# Patient Record
Sex: MALE | Race: OTHER | HISPANIC OR LATINO | URBAN - METROPOLITAN AREA
[De-identification: names, ages, dates, MRNs, and addresses within clinical notes are randomized per-mention and may not be internally consistent; named-entity substitution may affect disease eponyms.]

---
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
## 2020-07-10 NOTE — ASU PREOP CHECKLIST - INTERNAL PROSTHESES
Patient tolerated infusion well  Offers no complaints  Pt is aware of all future appointments   Refused AVS 
no
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
## 2021-04-06 ENCOUNTER — NON-APPOINTMENT (OUTPATIENT)
Age: 21
End: 2021-04-06

## 2021-04-06 ENCOUNTER — APPOINTMENT (OUTPATIENT)
Dept: DERMATOLOGY | Facility: CLINIC | Age: 21
End: 2021-04-06
Payer: MEDICAID

## 2021-04-06 VITALS — WEIGHT: 200 LBS | HEIGHT: 69 IN | BODY MASS INDEX: 29.62 KG/M2

## 2021-04-06 PROCEDURE — 17110 DESTRUCTION B9 LES UP TO 14: CPT

## 2021-04-06 PROCEDURE — 99202 OFFICE O/P NEW SF 15 MIN: CPT | Mod: 25

## 2021-04-06 PROCEDURE — 99072 ADDL SUPL MATRL&STAF TM PHE: CPT

## 2021-04-06 RX ORDER — FLUOROURACIL 50 MG/G
5 CREAM TOPICAL
Qty: 1 | Refills: 1 | Status: ACTIVE | COMMUNITY
Start: 2021-04-06 | End: 1900-01-01

## 2021-05-06 ENCOUNTER — APPOINTMENT (OUTPATIENT)
Dept: DERMATOLOGY | Facility: CLINIC | Age: 21
End: 2021-05-06
Payer: MEDICAID

## 2021-05-06 DIAGNOSIS — B07.8 OTHER VIRAL WARTS: ICD-10-CM

## 2021-05-06 PROCEDURE — 99072 ADDL SUPL MATRL&STAF TM PHE: CPT

## 2021-05-06 PROCEDURE — 99212 OFFICE O/P EST SF 10 MIN: CPT | Mod: 25

## 2021-05-06 PROCEDURE — 17110 DESTRUCTION B9 LES UP TO 14: CPT

## 2021-06-03 ENCOUNTER — APPOINTMENT (OUTPATIENT)
Dept: DERMATOLOGY | Facility: CLINIC | Age: 21
End: 2021-06-03

## 2022-10-29 ENCOUNTER — TRANSCRIPTION ENCOUNTER (OUTPATIENT)
Age: 22
End: 2022-10-29

## 2022-10-29 ENCOUNTER — INPATIENT (INPATIENT)
Facility: HOSPITAL | Age: 22
LOS: 7 days | Discharge: HOME | DRG: 488 | End: 2022-11-06
Attending: ORTHOPAEDIC SURGERY | Admitting: ORTHOPAEDIC SURGERY
Payer: MEDICAID

## 2022-10-29 VITALS
DIASTOLIC BLOOD PRESSURE: 68 MMHG | HEART RATE: 63 BPM | TEMPERATURE: 98 F | RESPIRATION RATE: 16 BRPM | OXYGEN SATURATION: 97 % | SYSTOLIC BLOOD PRESSURE: 110 MMHG | HEIGHT: 68 IN

## 2022-10-29 DIAGNOSIS — T14.8XXA OTHER INJURY OF UNSPECIFIED BODY REGION, INITIAL ENCOUNTER: ICD-10-CM

## 2022-10-29 DIAGNOSIS — Z98.890 OTHER SPECIFIED POSTPROCEDURAL STATES: Chronic | ICD-10-CM

## 2022-10-29 LAB
ALBUMIN SERPL ELPH-MCNC: 4.1 G/DL — SIGNIFICANT CHANGE UP (ref 3.3–5)
ALP SERPL-CCNC: 97 U/L — SIGNIFICANT CHANGE UP (ref 40–120)
ALT FLD-CCNC: 30 U/L — SIGNIFICANT CHANGE UP (ref 10–45)
ANION GAP SERPL CALC-SCNC: 12 MMOL/L — SIGNIFICANT CHANGE UP (ref 5–17)
APPEARANCE UR: CLEAR — SIGNIFICANT CHANGE UP
APTT BLD: 24 SEC — LOW (ref 27.5–35.5)
AST SERPL-CCNC: 22 U/L — SIGNIFICANT CHANGE UP (ref 10–40)
BASOPHILS # BLD AUTO: 0.03 K/UL — SIGNIFICANT CHANGE UP (ref 0–0.2)
BASOPHILS NFR BLD AUTO: 0.2 % — SIGNIFICANT CHANGE UP (ref 0–2)
BILIRUB SERPL-MCNC: 0.8 MG/DL — SIGNIFICANT CHANGE UP (ref 0.2–1.2)
BILIRUB UR-MCNC: NEGATIVE — SIGNIFICANT CHANGE UP
BLD GP AB SCN SERPL QL: NEGATIVE — SIGNIFICANT CHANGE UP
BUN SERPL-MCNC: 10 MG/DL — SIGNIFICANT CHANGE UP (ref 7–23)
CALCIUM SERPL-MCNC: 9.1 MG/DL — SIGNIFICANT CHANGE UP (ref 8.4–10.5)
CHLORIDE SERPL-SCNC: 103 MMOL/L — SIGNIFICANT CHANGE UP (ref 96–108)
CO2 SERPL-SCNC: 23 MMOL/L — SIGNIFICANT CHANGE UP (ref 22–31)
COLOR SPEC: SIGNIFICANT CHANGE UP
CREAT SERPL-MCNC: 1.04 MG/DL — SIGNIFICANT CHANGE UP (ref 0.5–1.3)
DIFF PNL FLD: NEGATIVE — SIGNIFICANT CHANGE UP
EGFR: 103 ML/MIN/1.73M2 — SIGNIFICANT CHANGE UP
EOSINOPHIL # BLD AUTO: 0.02 K/UL — SIGNIFICANT CHANGE UP (ref 0–0.5)
EOSINOPHIL NFR BLD AUTO: 0.1 % — SIGNIFICANT CHANGE UP (ref 0–6)
GAS PNL BLDV: SIGNIFICANT CHANGE UP
GLUCOSE SERPL-MCNC: 100 MG/DL — HIGH (ref 70–99)
GLUCOSE UR QL: NEGATIVE — SIGNIFICANT CHANGE UP
HCT VFR BLD CALC: 49.8 % — SIGNIFICANT CHANGE UP (ref 39–50)
HGB BLD-MCNC: 16.7 G/DL — SIGNIFICANT CHANGE UP (ref 13–17)
IMM GRANULOCYTES NFR BLD AUTO: 0.7 % — SIGNIFICANT CHANGE UP (ref 0–0.9)
INR BLD: 1.31 RATIO — HIGH (ref 0.88–1.16)
KETONES UR-MCNC: ABNORMAL
LACTATE SERPL-SCNC: 1.1 MMOL/L — SIGNIFICANT CHANGE UP (ref 0.5–2)
LEUKOCYTE ESTERASE UR-ACNC: NEGATIVE — SIGNIFICANT CHANGE UP
LIDOCAIN IGE QN: 36 U/L — SIGNIFICANT CHANGE UP (ref 7–60)
LYMPHOCYTES # BLD AUTO: 18.2 % — SIGNIFICANT CHANGE UP (ref 13–44)
LYMPHOCYTES # BLD AUTO: 3.03 K/UL — SIGNIFICANT CHANGE UP (ref 1–3.3)
MCHC RBC-ENTMCNC: 29.1 PG — SIGNIFICANT CHANGE UP (ref 27–34)
MCHC RBC-ENTMCNC: 33.5 GM/DL — SIGNIFICANT CHANGE UP (ref 32–36)
MCV RBC AUTO: 86.9 FL — SIGNIFICANT CHANGE UP (ref 80–100)
MONOCYTES # BLD AUTO: 1.33 K/UL — HIGH (ref 0–0.9)
MONOCYTES NFR BLD AUTO: 8 % — SIGNIFICANT CHANGE UP (ref 2–14)
NEUTROPHILS # BLD AUTO: 12.14 K/UL — HIGH (ref 1.8–7.4)
NEUTROPHILS NFR BLD AUTO: 72.8 % — SIGNIFICANT CHANGE UP (ref 43–77)
NITRITE UR-MCNC: NEGATIVE — SIGNIFICANT CHANGE UP
NRBC # BLD: 0 /100 WBCS — SIGNIFICANT CHANGE UP (ref 0–0)
PH UR: 6.5 — SIGNIFICANT CHANGE UP (ref 5–8)
PLATELET # BLD AUTO: 281 K/UL — SIGNIFICANT CHANGE UP (ref 150–400)
POTASSIUM SERPL-MCNC: 3.7 MMOL/L — SIGNIFICANT CHANGE UP (ref 3.5–5.3)
POTASSIUM SERPL-SCNC: 3.7 MMOL/L — SIGNIFICANT CHANGE UP (ref 3.5–5.3)
PROT SERPL-MCNC: 7 G/DL — SIGNIFICANT CHANGE UP (ref 6–8.3)
PROT UR-MCNC: SIGNIFICANT CHANGE UP
PROTHROM AB SERPL-ACNC: 15.2 SEC — HIGH (ref 10.5–13.4)
RBC # BLD: 5.73 M/UL — SIGNIFICANT CHANGE UP (ref 4.2–5.8)
RBC # FLD: 12.8 % — SIGNIFICANT CHANGE UP (ref 10.3–14.5)
RH IG SCN BLD-IMP: POSITIVE — SIGNIFICANT CHANGE UP
SARS-COV-2 RNA SPEC QL NAA+PROBE: SIGNIFICANT CHANGE UP
SODIUM SERPL-SCNC: 138 MMOL/L — SIGNIFICANT CHANGE UP (ref 135–145)
SP GR SPEC: 1.05 — HIGH (ref 1.01–1.02)
UROBILINOGEN FLD QL: NEGATIVE — SIGNIFICANT CHANGE UP
WBC # BLD: 16.66 K/UL — HIGH (ref 3.8–10.5)
WBC # FLD AUTO: 16.66 K/UL — HIGH (ref 3.8–10.5)

## 2022-10-29 PROCEDURE — 99283 EMERGENCY DEPT VISIT LOW MDM: CPT

## 2022-10-29 PROCEDURE — 72125 CT NECK SPINE W/O DYE: CPT | Mod: 26,MA

## 2022-10-29 PROCEDURE — 73700 CT LOWER EXTREMITY W/O DYE: CPT | Mod: 26,RT,MA,59

## 2022-10-29 PROCEDURE — 73630 X-RAY EXAM OF FOOT: CPT | Mod: 26,50

## 2022-10-29 PROCEDURE — 73110 X-RAY EXAM OF WRIST: CPT | Mod: 26,50

## 2022-10-29 PROCEDURE — 73610 X-RAY EXAM OF ANKLE: CPT | Mod: 26,RT

## 2022-10-29 PROCEDURE — 71045 X-RAY EXAM CHEST 1 VIEW: CPT | Mod: 26

## 2022-10-29 PROCEDURE — 29125 APPL SHORT ARM SPLINT STATIC: CPT

## 2022-10-29 PROCEDURE — 73130 X-RAY EXAM OF HAND: CPT | Mod: 26,RT

## 2022-10-29 PROCEDURE — 74177 CT ABD & PELVIS W/CONTRAST: CPT | Mod: 26,MA

## 2022-10-29 PROCEDURE — 73564 X-RAY EXAM KNEE 4 OR MORE: CPT | Mod: 26,RT

## 2022-10-29 PROCEDURE — 99291 CRITICAL CARE FIRST HOUR: CPT | Mod: 25

## 2022-10-29 PROCEDURE — 73030 X-RAY EXAM OF SHOULDER: CPT | Mod: 26,RT

## 2022-10-29 PROCEDURE — 71260 CT THORAX DX C+: CPT | Mod: 26,MA

## 2022-10-29 PROCEDURE — 73706 CT ANGIO LWR EXTR W/O&W/DYE: CPT | Mod: 26,RT,MA

## 2022-10-29 PROCEDURE — 70450 CT HEAD/BRAIN W/O DYE: CPT | Mod: 26,MA

## 2022-10-29 RX ORDER — SODIUM CHLORIDE 9 MG/ML
250 INJECTION INTRAMUSCULAR; INTRAVENOUS; SUBCUTANEOUS ONCE
Refills: 0 | Status: COMPLETED | OUTPATIENT
Start: 2022-10-29 | End: 2022-10-29

## 2022-10-29 RX ORDER — ALTEPLASE 100 MG
7.5 KIT INTRAVENOUS ONCE
Refills: 0 | Status: DISCONTINUED | OUTPATIENT
Start: 2022-10-29 | End: 2022-10-29

## 2022-10-29 RX ORDER — INFLUENZA VIRUS VACCINE 15; 15; 15; 15 UG/.5ML; UG/.5ML; UG/.5ML; UG/.5ML
0.5 SUSPENSION INTRAMUSCULAR ONCE
Refills: 0 | Status: DISCONTINUED | OUTPATIENT
Start: 2022-10-29 | End: 2022-11-04

## 2022-10-29 RX ORDER — SODIUM CHLORIDE 9 MG/ML
1000 INJECTION INTRAMUSCULAR; INTRAVENOUS; SUBCUTANEOUS ONCE
Refills: 0 | Status: COMPLETED | OUTPATIENT
Start: 2022-10-29 | End: 2022-10-29

## 2022-10-29 RX ORDER — SODIUM CHLORIDE 9 MG/ML
1000 INJECTION, SOLUTION INTRAVENOUS
Refills: 0 | Status: DISCONTINUED | OUTPATIENT
Start: 2022-10-29 | End: 2022-10-30

## 2022-10-29 RX ORDER — TETANUS TOXOID, REDUCED DIPHTHERIA TOXOID AND ACELLULAR PERTUSSIS VACCINE, ADSORBED 5; 2.5; 8; 8; 2.5 [IU]/.5ML; [IU]/.5ML; UG/.5ML; UG/.5ML; UG/.5ML
0.5 SUSPENSION INTRAMUSCULAR ONCE
Refills: 0 | Status: COMPLETED | OUTPATIENT
Start: 2022-10-29 | End: 2022-10-29

## 2022-10-29 RX ORDER — CEFAZOLIN SODIUM 1 G
1000 VIAL (EA) INJECTION ONCE
Refills: 0 | Status: COMPLETED | OUTPATIENT
Start: 2022-10-29 | End: 2022-10-29

## 2022-10-29 RX ORDER — FENTANYL CITRATE 50 UG/ML
50 INJECTION INTRAVENOUS ONCE
Refills: 0 | Status: DISCONTINUED | OUTPATIENT
Start: 2022-10-29 | End: 2022-10-29

## 2022-10-29 RX ORDER — SODIUM CHLORIDE 9 MG/ML
50 INJECTION INTRAMUSCULAR; INTRAVENOUS; SUBCUTANEOUS
Refills: 0 | Status: DISCONTINUED | OUTPATIENT
Start: 2022-10-29 | End: 2022-10-29

## 2022-10-29 RX ORDER — HYDROMORPHONE HYDROCHLORIDE 2 MG/ML
0.5 INJECTION INTRAMUSCULAR; INTRAVENOUS; SUBCUTANEOUS EVERY 4 HOURS
Refills: 0 | Status: DISCONTINUED | OUTPATIENT
Start: 2022-10-29 | End: 2022-11-04

## 2022-10-29 RX ORDER — FOLIC ACID 0.8 MG
1 TABLET ORAL DAILY
Refills: 0 | Status: DISCONTINUED | OUTPATIENT
Start: 2022-10-29 | End: 2022-11-04

## 2022-10-29 RX ORDER — GENTAMICIN SULFATE 40 MG/ML
70 VIAL (ML) INJECTION ONCE
Refills: 0 | Status: DISCONTINUED | OUTPATIENT
Start: 2022-10-29 | End: 2022-10-29

## 2022-10-29 RX ORDER — OXYCODONE HYDROCHLORIDE 5 MG/1
10 TABLET ORAL EVERY 4 HOURS
Refills: 0 | Status: DISCONTINUED | OUTPATIENT
Start: 2022-10-29 | End: 2022-11-04

## 2022-10-29 RX ORDER — CEFAZOLIN SODIUM 1 G
2000 VIAL (EA) INJECTION EVERY 8 HOURS
Refills: 0 | Status: DISCONTINUED | OUTPATIENT
Start: 2022-10-29 | End: 2022-10-30

## 2022-10-29 RX ORDER — FENTANYL CITRATE 50 UG/ML
25 INJECTION INTRAVENOUS ONCE
Refills: 0 | Status: DISCONTINUED | OUTPATIENT
Start: 2022-10-29 | End: 2022-10-29

## 2022-10-29 RX ORDER — ALTEPLASE 100 MG
67.6 KIT INTRAVENOUS ONCE
Refills: 0 | Status: DISCONTINUED | OUTPATIENT
Start: 2022-10-29 | End: 2022-10-29

## 2022-10-29 RX ORDER — MAGNESIUM HYDROXIDE 400 MG/1
30 TABLET, CHEWABLE ORAL DAILY
Refills: 0 | Status: DISCONTINUED | OUTPATIENT
Start: 2022-10-29 | End: 2022-11-04

## 2022-10-29 RX ORDER — ONDANSETRON 8 MG/1
4 TABLET, FILM COATED ORAL ONCE
Refills: 0 | Status: COMPLETED | OUTPATIENT
Start: 2022-10-29 | End: 2022-10-29

## 2022-10-29 RX ORDER — ACETAMINOPHEN 500 MG
650 TABLET ORAL EVERY 6 HOURS
Refills: 0 | Status: DISCONTINUED | OUTPATIENT
Start: 2022-10-29 | End: 2022-10-30

## 2022-10-29 RX ORDER — GENTAMICIN SULFATE 40 MG/ML
420 VIAL (ML) INJECTION ONCE
Refills: 0 | Status: COMPLETED | OUTPATIENT
Start: 2022-10-29 | End: 2022-10-29

## 2022-10-29 RX ORDER — ACETAMINOPHEN 500 MG
1000 TABLET ORAL ONCE
Refills: 0 | Status: COMPLETED | OUTPATIENT
Start: 2022-10-29 | End: 2022-10-29

## 2022-10-29 RX ORDER — OXYCODONE HYDROCHLORIDE 5 MG/1
5 TABLET ORAL EVERY 4 HOURS
Refills: 0 | Status: DISCONTINUED | OUTPATIENT
Start: 2022-10-29 | End: 2022-11-04

## 2022-10-29 RX ORDER — ENOXAPARIN SODIUM 100 MG/ML
40 INJECTION SUBCUTANEOUS ONCE
Refills: 0 | Status: DISCONTINUED | OUTPATIENT
Start: 2022-10-29 | End: 2022-10-30

## 2022-10-29 RX ADMIN — Medication 300 MILLIGRAM(S): at 21:30

## 2022-10-29 RX ADMIN — Medication 1000 MILLIGRAM(S): at 20:57

## 2022-10-29 RX ADMIN — SODIUM CHLORIDE 1000 MILLILITER(S): 9 INJECTION INTRAMUSCULAR; INTRAVENOUS; SUBCUTANEOUS at 20:57

## 2022-10-29 RX ADMIN — Medication 100 MILLIGRAM(S): at 17:31

## 2022-10-29 RX ADMIN — FENTANYL CITRATE 50 MICROGRAM(S): 50 INJECTION INTRAVENOUS at 20:55

## 2022-10-29 RX ADMIN — TETANUS TOXOID, REDUCED DIPHTHERIA TOXOID AND ACELLULAR PERTUSSIS VACCINE, ADSORBED 0.5 MILLILITER(S): 5; 2.5; 8; 8; 2.5 SUSPENSION INTRAMUSCULAR at 17:29

## 2022-10-29 RX ADMIN — FENTANYL CITRATE 25 MICROGRAM(S): 50 INJECTION INTRAVENOUS at 18:53

## 2022-10-29 RX ADMIN — SODIUM CHLORIDE 250 MILLILITER(S): 9 INJECTION INTRAMUSCULAR; INTRAVENOUS; SUBCUTANEOUS at 20:57

## 2022-10-29 RX ADMIN — SODIUM CHLORIDE 250 MILLILITER(S): 9 INJECTION INTRAMUSCULAR; INTRAVENOUS; SUBCUTANEOUS at 17:33

## 2022-10-29 RX ADMIN — Medication 400 MILLIGRAM(S): at 18:45

## 2022-10-29 RX ADMIN — Medication 100 MILLIGRAM(S): at 20:08

## 2022-10-29 RX ADMIN — ONDANSETRON 4 MILLIGRAM(S): 8 TABLET, FILM COATED ORAL at 22:50

## 2022-10-29 RX ADMIN — SODIUM CHLORIDE 1000 MILLILITER(S): 9 INJECTION INTRAMUSCULAR; INTRAVENOUS; SUBCUTANEOUS at 19:54

## 2022-10-29 NOTE — PATIENT PROFILE ADULT - FALL HARM RISK - HARM RISK INTERVENTIONS
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
## 2022-10-29 NOTE — ED PROVIDER NOTE - NS ED ROS FT
GENERAL: No fever, chills  EYES: no vision changes, no discharge.   ENT: no difficulty swallowing or speaking   CARDIAC: no chest pain/pressure, SOB, lower extremity swelling  PULMONARY: no cough, SOB  GI: no abdominal pain, n/v/d  : no dysuria  SKIN: no rashes  NEURO: no headache, lightheadedness, paresthesia  MSK: +R knee pain. R shoulder pain
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
## 2022-10-29 NOTE — ED PROVIDER NOTE - CARE PLAN
1 Principal Discharge DX:	Avulsion of skin  Secondary Diagnosis:	Open traumatic subluxation of right knee joint
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
## 2022-10-29 NOTE — ED ADULT NURSE NOTE - NSIMPLEMENTINTERV_GEN_ALL_ED
Implemented All Fall Risk Interventions:  [place] to call system. Call bell, personal items and telephone within reach. Instruct patient to call for assistance. Room bathroom lighting operational. Non-slip footwear when patient is off stretcher. Physically safe environment: no spills, clutter or unnecessary equipment. Stretcher in lowest position, wheels locked, appropriate side rails in place. Provide visual cue, wrist band, yellow gown, etc. Monitor gait and stability. Monitor for mental status changes and reorient to person, place, and time. Review medications for side effects contributing to fall risk. Reinforce activity limits and safety measures with patient and family.

## 2022-10-29 NOTE — PROVIDER CONTACT NOTE (OTHER) - ASSESSMENT
Pt a&ox4, VSS. Pt denies chest pain or discomfort. Pt c/o nausea at this time. Fentanyl given for pain at 2055

## 2022-10-29 NOTE — ED PROVIDER NOTE - ATTENDING CONTRIBUTION TO CARE
Attending MD Madeline Elmore:  I personally have seen and examined this patient.  Resident note reviewed and agree on plan of care and except where noted.  See HPI, PE, and MDM for details.

## 2022-10-29 NOTE — ED PROVIDER NOTE - PROGRESS NOTE DETAILS
ortho consulted for concern for open joint knee Attending Madeline Elmore: concern for possible open fracture. pt with strong distal pulses. pt received ancef already after initial evaluation. called ct scans to expedite Gogo MCDONALD PGY-3: spoke with orthopedics who will take pt for OR washout in the AM. still pending pan CT to eval for other injuries. Patient with R hand 5th metacarpal base fx and L hand 2nd head metacarpal fx. spoke with plastics, pt to have R ulnar gutter splint Gogo MCDONALD PGY-3: spoke with orthopedics who will take pt for OR washout in the AM. still pending pan CT to eval for other injuries. Patient with R hand 5th metacarpal base fx and L hand 2nd head metacarpal fx. spoke with plastics, pt to have R ulnar gutter splint +/- to follow at outpatient office Attending Madeline Elmore: d/w trauma will come to evaluate
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
## 2022-10-29 NOTE — ED PROVIDER NOTE - PHYSICAL EXAMINATION
Attending Madeline Elmore: Gen: NAD, heent: atrauamtic, eomi, perrla, mmm, op pink, uvula midline, neck; nttp, no nuchal rigidity, chest: nttp, no crepitus, cv: rrr, no murmurs, lungs: ctab, abd: soft, nontender, nondistended, no peritoneal signs,, no guarding, ext: wwp, swelling to base of 2nd metacarpal on left hand dorsum with ttp, cap refill to digit in tact, avulsion injury to right knee, no visible bone seen. pt able to range his knee, abrasions to left and right foot, abrasion and ecchymoses to right ankle medial and lateraol malleous. ttp right anterior shoulder, no deformity, neuro: awake and alert, following commands, speech clear, sensation and strength intact, no focal deficits

## 2022-10-30 LAB
ANION GAP SERPL CALC-SCNC: 9 MMOL/L — SIGNIFICANT CHANGE UP (ref 5–17)
APTT BLD: 28.5 SEC — SIGNIFICANT CHANGE UP (ref 27.5–35.5)
BUN SERPL-MCNC: 9 MG/DL — SIGNIFICANT CHANGE UP (ref 7–23)
CALCIUM SERPL-MCNC: 8.8 MG/DL — SIGNIFICANT CHANGE UP (ref 8.4–10.5)
CHLORIDE SERPL-SCNC: 106 MMOL/L — SIGNIFICANT CHANGE UP (ref 96–108)
CO2 SERPL-SCNC: 24 MMOL/L — SIGNIFICANT CHANGE UP (ref 22–31)
CREAT SERPL-MCNC: 0.94 MG/DL — SIGNIFICANT CHANGE UP (ref 0.5–1.3)
EGFR: 117 ML/MIN/1.73M2 — SIGNIFICANT CHANGE UP
GLUCOSE SERPL-MCNC: 106 MG/DL — HIGH (ref 70–99)
HCT VFR BLD CALC: 45.3 % — SIGNIFICANT CHANGE UP (ref 39–50)
HGB BLD-MCNC: 15.2 G/DL — SIGNIFICANT CHANGE UP (ref 13–17)
INR BLD: 1.29 RATIO — HIGH (ref 0.88–1.16)
MCHC RBC-ENTMCNC: 29 PG — SIGNIFICANT CHANGE UP (ref 27–34)
MCHC RBC-ENTMCNC: 33.6 GM/DL — SIGNIFICANT CHANGE UP (ref 32–36)
MCV RBC AUTO: 86.5 FL — SIGNIFICANT CHANGE UP (ref 80–100)
NRBC # BLD: 0 /100 WBCS — SIGNIFICANT CHANGE UP (ref 0–0)
PLATELET # BLD AUTO: 227 K/UL — SIGNIFICANT CHANGE UP (ref 150–400)
POTASSIUM SERPL-MCNC: 3.5 MMOL/L — SIGNIFICANT CHANGE UP (ref 3.5–5.3)
POTASSIUM SERPL-SCNC: 3.5 MMOL/L — SIGNIFICANT CHANGE UP (ref 3.5–5.3)
PROTHROM AB SERPL-ACNC: 15 SEC — HIGH (ref 10.5–13.4)
RBC # BLD: 5.24 M/UL — SIGNIFICANT CHANGE UP (ref 4.2–5.8)
RBC # FLD: 13 % — SIGNIFICANT CHANGE UP (ref 10.3–14.5)
SODIUM SERPL-SCNC: 139 MMOL/L — SIGNIFICANT CHANGE UP (ref 135–145)
WBC # BLD: 12.5 K/UL — HIGH (ref 3.8–10.5)
WBC # FLD AUTO: 12.5 K/UL — HIGH (ref 3.8–10.5)

## 2022-10-30 RX ORDER — ASPIRIN/CALCIUM CARB/MAGNESIUM 324 MG
81 TABLET ORAL
Refills: 0 | Status: DISCONTINUED | OUTPATIENT
Start: 2022-10-30 | End: 2022-11-04

## 2022-10-30 RX ORDER — FENTANYL CITRATE 50 UG/ML
25 INJECTION INTRAVENOUS
Refills: 0 | Status: DISCONTINUED | OUTPATIENT
Start: 2022-10-30 | End: 2022-10-30

## 2022-10-30 RX ORDER — ACETAMINOPHEN 500 MG
975 TABLET ORAL EVERY 8 HOURS
Refills: 0 | Status: DISCONTINUED | OUTPATIENT
Start: 2022-10-30 | End: 2022-11-04

## 2022-10-30 RX ORDER — CEFAZOLIN SODIUM 1 G
2000 VIAL (EA) INJECTION EVERY 8 HOURS
Refills: 0 | Status: COMPLETED | OUTPATIENT
Start: 2022-10-30 | End: 2022-10-31

## 2022-10-30 RX ORDER — ONDANSETRON 8 MG/1
4 TABLET, FILM COATED ORAL ONCE
Refills: 0 | Status: DISCONTINUED | OUTPATIENT
Start: 2022-10-30 | End: 2022-10-30

## 2022-10-30 RX ADMIN — Medication 100 MILLIGRAM(S): at 17:52

## 2022-10-30 RX ADMIN — Medication 975 MILLIGRAM(S): at 14:25

## 2022-10-30 RX ADMIN — FENTANYL CITRATE 25 MICROGRAM(S): 50 INJECTION INTRAVENOUS at 10:00

## 2022-10-30 RX ADMIN — Medication 975 MILLIGRAM(S): at 22:42

## 2022-10-30 RX ADMIN — Medication 975 MILLIGRAM(S): at 15:20

## 2022-10-30 RX ADMIN — Medication 975 MILLIGRAM(S): at 22:12

## 2022-10-30 RX ADMIN — Medication 1 MILLIGRAM(S): at 12:46

## 2022-10-30 RX ADMIN — Medication 650 MILLIGRAM(S): at 01:33

## 2022-10-30 RX ADMIN — FENTANYL CITRATE 25 MICROGRAM(S): 50 INJECTION INTRAVENOUS at 09:45

## 2022-10-30 RX ADMIN — Medication 650 MILLIGRAM(S): at 01:03

## 2022-10-30 RX ADMIN — SODIUM CHLORIDE 100 MILLILITER(S): 9 INJECTION, SOLUTION INTRAVENOUS at 00:10

## 2022-10-30 RX ADMIN — Medication 100 MILLIGRAM(S): at 01:03

## 2022-10-30 RX ADMIN — Medication 1 TABLET(S): at 12:46
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
## 2022-10-30 NOTE — ED POST DISCHARGE NOTE - RESULT SUMMARY
Radiology Discrepancy Reported On [place]: Xray R knee +Subchondral impaction fracture with multiple small fracture fragments of the lateral femoral condyle as seen on right knee CT. Pt taken to OR with ortho for arthrotomy I&D. No change in management. - Natalie Barahona PA-C
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
## 2022-10-31 ENCOUNTER — TRANSCRIPTION ENCOUNTER (OUTPATIENT)
Age: 22
End: 2022-10-31

## 2022-10-31 LAB
ANION GAP SERPL CALC-SCNC: 11 MMOL/L — SIGNIFICANT CHANGE UP (ref 5–17)
BUN SERPL-MCNC: 8 MG/DL — SIGNIFICANT CHANGE UP (ref 7–23)
CALCIUM SERPL-MCNC: 8.6 MG/DL — SIGNIFICANT CHANGE UP (ref 8.4–10.5)
CHLORIDE SERPL-SCNC: 106 MMOL/L — SIGNIFICANT CHANGE UP (ref 96–108)
CO2 SERPL-SCNC: 23 MMOL/L — SIGNIFICANT CHANGE UP (ref 22–31)
CREAT SERPL-MCNC: 0.89 MG/DL — SIGNIFICANT CHANGE UP (ref 0.5–1.3)
EGFR: 123 ML/MIN/1.73M2 — SIGNIFICANT CHANGE UP
GLUCOSE SERPL-MCNC: 90 MG/DL — SIGNIFICANT CHANGE UP (ref 70–99)
HCT VFR BLD CALC: 42.9 % — SIGNIFICANT CHANGE UP (ref 39–50)
HGB BLD-MCNC: 14.2 G/DL — SIGNIFICANT CHANGE UP (ref 13–17)
MCHC RBC-ENTMCNC: 29 PG — SIGNIFICANT CHANGE UP (ref 27–34)
MCHC RBC-ENTMCNC: 33.1 GM/DL — SIGNIFICANT CHANGE UP (ref 32–36)
MCV RBC AUTO: 87.6 FL — SIGNIFICANT CHANGE UP (ref 80–100)
NRBC # BLD: 0 /100 WBCS — SIGNIFICANT CHANGE UP (ref 0–0)
PLATELET # BLD AUTO: 198 K/UL — SIGNIFICANT CHANGE UP (ref 150–400)
POTASSIUM SERPL-MCNC: 3.5 MMOL/L — SIGNIFICANT CHANGE UP (ref 3.5–5.3)
POTASSIUM SERPL-SCNC: 3.5 MMOL/L — SIGNIFICANT CHANGE UP (ref 3.5–5.3)
RBC # BLD: 4.9 M/UL — SIGNIFICANT CHANGE UP (ref 4.2–5.8)
RBC # FLD: 13 % — SIGNIFICANT CHANGE UP (ref 10.3–14.5)
SODIUM SERPL-SCNC: 140 MMOL/L — SIGNIFICANT CHANGE UP (ref 135–145)
WBC # BLD: 10.74 K/UL — HIGH (ref 3.8–10.5)
WBC # FLD AUTO: 10.74 K/UL — HIGH (ref 3.8–10.5)

## 2022-10-31 RX ADMIN — Medication 975 MILLIGRAM(S): at 21:18

## 2022-10-31 RX ADMIN — OXYCODONE HYDROCHLORIDE 5 MILLIGRAM(S): 5 TABLET ORAL at 16:29

## 2022-10-31 RX ADMIN — OXYCODONE HYDROCHLORIDE 5 MILLIGRAM(S): 5 TABLET ORAL at 14:42

## 2022-10-31 RX ADMIN — Medication 81 MILLIGRAM(S): at 17:04

## 2022-10-31 RX ADMIN — Medication 1 TABLET(S): at 12:44

## 2022-10-31 RX ADMIN — Medication 81 MILLIGRAM(S): at 05:23

## 2022-10-31 RX ADMIN — Medication 975 MILLIGRAM(S): at 05:23

## 2022-10-31 RX ADMIN — Medication 975 MILLIGRAM(S): at 21:48

## 2022-10-31 RX ADMIN — Medication 975 MILLIGRAM(S): at 05:53

## 2022-10-31 RX ADMIN — Medication 1 MILLIGRAM(S): at 12:44

## 2022-10-31 RX ADMIN — OXYCODONE HYDROCHLORIDE 10 MILLIGRAM(S): 5 TABLET ORAL at 20:09

## 2022-10-31 RX ADMIN — OXYCODONE HYDROCHLORIDE 10 MILLIGRAM(S): 5 TABLET ORAL at 20:39

## 2022-10-31 RX ADMIN — Medication 100 MILLIGRAM(S): at 01:21

## 2022-10-31 NOTE — DISCHARGE NOTE PROVIDER - CARE PROVIDER_API CALL
Robinson Tobias)  Orthopaedic Surgery  [address] [place] [address], [address]  [place] [address]  Phone: [phone]  Fax: [phone]  Established Patient  Follow Up Time: 2 weeks   Agustina Meza (MD)  Surgery  [address] [place] [id number]  Phone: [phone]  Fax: [phone]  Follow Up Time: 1 week   Emerald Curtis (MD; MPH)  Orthopaedic Surgery  [address] [facility], [address]  [place] [address]  Phone: [phone]  Fax: [phone]  Follow Up Time:

## 2022-10-31 NOTE — CONSULT NOTE ADULT - ADDITIONAL PE
Examination of the hand reveals immobilized wrists. The fingers are perfused well. No vascular compromise noted.

## 2022-10-31 NOTE — PHYSICAL THERAPY INITIAL EVALUATION ADULT - MANUAL MUSCLE TESTING RESULTS, REHAB EVAL
b/l elbows and shoulders at least 3/5; b/l wrist and fingers not formally tested due to casting and splint material; RLE in KI; LLE in short leg cast

## 2022-10-31 NOTE — PHYSICAL THERAPY INITIAL EVALUATION ADULT - ADDITIONAL COMMENTS
Pt lives with parents however states upon d/c he can stay at brothers home with elevator access and ramp. Prior to admission pt was independent with all functional mobility and no AD.

## 2022-10-31 NOTE — DISCHARGE NOTE PROVIDER - NSDCFUADDINST_GEN_ALL_CORE_FT
Follow up with Dr. Tobias in 2 weeks.   Weight bearing: You may weight bear on the right leg with knee immobilizer on at all times. You may not weight bear on the left leg at all. Left and right hand:  Continue with Aspirin as prescribed for blood clot prevention. Continue with protonix for stomach protection.    Follow up with Orthopedic Clinic within 2 weeks. Please call number above for [facility] to schedule appointment.    Weight bearing: You may weight bear on the right leg with knee immobilizer on at all times. You may not weight bear on the left leg or left and right hand at all.   Continue with baby Aspirin 81 mg orally twice a day x 6 weeks for blood clot prevention. Continue with protonix for stomach protection.   Sponge bathe only, must keep cast and hand splits dry at all times.     Weight bearing: Non weightbearing bilateral upper extremities except for use with platform walker, you may weight bear on the right leg with knee immobilizer on at all times. You may not weight bear on the left leg.    Continue with baby Aspirin 81 mg orally twice a day x 6 weeks for blood clot prevention. Continue with Protonix for stomach protection.     Sponge bathe only, must keep cast and hand splits dry at all times.

## 2022-10-31 NOTE — DISCHARGE NOTE PROVIDER - PROVIDER TOKENS
PROVIDER:[TOKEN:[[id number]],FOLLOWUP:[2 weeks],ESTABLISHEDPATIENT:[T]] PROVIDER:[TOKEN:[[id number]],FOLLOWUP:[1 week]] PROVIDER:[TOKEN:[[id number]]]

## 2022-10-31 NOTE — DISCHARGE NOTE PROVIDER - NSDCHHASSISTDEVIC_GEN_ALL_CORE_FT
S/P R Knee traumatic arthrotomy I&D, S/P L metatarsal shaft Fx closed reduction and casting, s/p L Index metacarpal Fx Im screw fixation and R 5th digit metacarpal base Fx closed reduction percutaneous pinning.
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
## 2022-10-31 NOTE — DISCHARGE NOTE PROVIDER - CARE PROVIDERS DIRECT ADDRESSES
,DirectAddress_Unknown ,[email or web address]

## 2022-10-31 NOTE — DISCHARGE NOTE PROVIDER - NSDCHC_MEDRECSTATUS_GEN_ALL_CORE
Admission Reconciliation is Completed  Discharge Reconciliation is Not Complete (4) rarely moist Admission Reconciliation is Completed  Discharge Reconciliation is Completed
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
## 2022-10-31 NOTE — DISCHARGE NOTE PROVIDER - NSFOLLOWUPCLINICS_GEN_ALL_ED_FT
[facility] Orthopedic Surgery  Orthopedic Surgery  [address], [address]rd & 4th [place] [address]  Phone: [phone]  Fax:   Follow Up Time: 2 weeks
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
## 2022-10-31 NOTE — DISCHARGE NOTE PROVIDER - NSDCCPTREATMENT_GEN_ALL_CORE_FT
PRINCIPAL PROCEDURE  Procedure: Incision and drainage of bursa of knee  Findings and Treatment:       SECONDARY PROCEDURE  Procedure: Closed treatment, fracture, metatarsal bone, without manipulation  Findings and Treatment:

## 2022-10-31 NOTE — DISCHARGE NOTE PROVIDER - NSDCCPCAREPLAN_GEN_ALL_CORE_FT
PRINCIPAL DISCHARGE DIAGNOSIS  Diagnosis: Open traumatic subluxation of right knee joint  Assessment and Plan of Treatment:       SECONDARY DISCHARGE DIAGNOSES  Diagnosis: Open traumatic subluxation of right knee joint  Assessment and Plan of Treatment:     Diagnosis: Closed displaced fracture of metatarsal bone, initial encounter  Assessment and Plan of Treatment:     Diagnosis: Fracture of metacarpal of left hand, closed  Assessment and Plan of Treatment:     Diagnosis: Closed fracture of metacarpal bone of right hand  Assessment and Plan of Treatment:

## 2022-10-31 NOTE — DISCHARGE NOTE PROVIDER - NSDCMRMEDTOKEN_GEN_ALL_CORE_FT
Ascriptin 325 mg oral tablet: 1 tab(s) orally once a day for dvt ppx MDD:1  Doculase 100 mg oral capsule: 1 cap(s) orally 3 times a day for constipation MDD:3  ondansetron 4 mg oral tablet: 1 tab(s) orally every 4 hours prn for nausea  oxyCODONE 5 mg oral capsule: 1 cap(s) orally every 4 hours, please take 1-2 tablets by mouth every 4-6 hrs as needed for painMDD:6   acetaminophen 325 mg oral tablet: 3 tab(s) orally every 8 hours  aspirin 81 mg oral delayed release tablet: 1 tab(s) orally 2 times a day  Drop arm commode: Dx: R Knee Traumatic Arthrotomy I&amp;D  JIA: 99M  oxyCODONE 5 mg oral tablet: 1 tab(s) orally every 4 hours, As needed, Severe Pain (7 - 10) MDD:6  pantoprazole 40 mg oral delayed release tablet: 1 tab(s) orally once a day (before a meal)  senna leaf extract oral tablet: 2 tab(s) orally once a day (at bedtime), As Needed for constipation while on narcotic pain meds  standard wheelchair with elevated rests: s/p BUE Fractures        Arthrotomy right knee      JIA:99mos    traMADol 50 mg oral tablet: 1 tab(s) orally every 6 hours, As needed, Moderate Pain (4-6) MDD:4

## 2022-11-01 LAB — SARS-COV-2 RNA SPEC QL NAA+PROBE: SIGNIFICANT CHANGE UP

## 2022-11-01 RX ADMIN — Medication 975 MILLIGRAM(S): at 21:10

## 2022-11-01 RX ADMIN — OXYCODONE HYDROCHLORIDE 10 MILLIGRAM(S): 5 TABLET ORAL at 22:00

## 2022-11-01 RX ADMIN — Medication 975 MILLIGRAM(S): at 22:00

## 2022-11-01 RX ADMIN — Medication 975 MILLIGRAM(S): at 12:19

## 2022-11-01 RX ADMIN — Medication 81 MILLIGRAM(S): at 17:55

## 2022-11-01 RX ADMIN — Medication 975 MILLIGRAM(S): at 13:00

## 2022-11-01 RX ADMIN — Medication 1 TABLET(S): at 12:20

## 2022-11-01 RX ADMIN — Medication 975 MILLIGRAM(S): at 07:05

## 2022-11-01 RX ADMIN — Medication 1 MILLIGRAM(S): at 12:20

## 2022-11-01 RX ADMIN — Medication 81 MILLIGRAM(S): at 06:36

## 2022-11-01 RX ADMIN — OXYCODONE HYDROCHLORIDE 10 MILLIGRAM(S): 5 TABLET ORAL at 21:10

## 2022-11-01 RX ADMIN — Medication 975 MILLIGRAM(S): at 06:35
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
## 2022-11-02 PROCEDURE — 99223 1ST HOSP IP/OBS HIGH 75: CPT

## 2022-11-02 RX ORDER — SENNA PLUS 8.6 MG/1
2 TABLET ORAL AT BEDTIME
Refills: 0 | Status: DISCONTINUED | OUTPATIENT
Start: 2022-11-02 | End: 2022-11-04

## 2022-11-02 RX ORDER — POLYETHYLENE GLYCOL 3350 17 G/17G
17 POWDER, FOR SOLUTION ORAL AT BEDTIME
Refills: 0 | Status: DISCONTINUED | OUTPATIENT
Start: 2022-11-02 | End: 2022-11-04

## 2022-11-02 RX ADMIN — Medication 81 MILLIGRAM(S): at 17:27

## 2022-11-02 RX ADMIN — Medication 1 TABLET(S): at 11:16

## 2022-11-02 RX ADMIN — Medication 1 MILLIGRAM(S): at 11:16

## 2022-11-02 RX ADMIN — Medication 975 MILLIGRAM(S): at 13:35

## 2022-11-02 RX ADMIN — Medication 975 MILLIGRAM(S): at 07:20

## 2022-11-02 RX ADMIN — Medication 975 MILLIGRAM(S): at 21:44

## 2022-11-02 RX ADMIN — SENNA PLUS 2 TABLET(S): 8.6 TABLET ORAL at 21:14

## 2022-11-02 RX ADMIN — Medication 975 MILLIGRAM(S): at 13:04

## 2022-11-02 RX ADMIN — Medication 975 MILLIGRAM(S): at 21:14

## 2022-11-02 RX ADMIN — Medication 81 MILLIGRAM(S): at 06:29

## 2022-11-02 RX ADMIN — Medication 975 MILLIGRAM(S): at 06:30

## 2022-11-02 NOTE — PROGRESS NOTE ADULT - ASSESSMENT
A/P: 22M POD2 s/p R knee I&D POD #3 and SLC application of LLE    - Medical comanagement appreciated  - Pain control PRN  - FU AM Labs  - DVT ppx: ASA  - WBAT RLE in KI at all times, no knee flexion  - NWB LLE in SLC  - FU hand surgery recs appreciated regarding UE injuries.   - PT/OT  - Orthopedically stable for discharge. Patient to follow up with Dr. Tobias for further evaluation and treatment. Plan for discharge home today if clears physical therapy evaluation  Will discuss with attending Dr. Tobias and advise if any changes to plan A/P: 22M POD2 s/p R knee I&D POD #3 and SLC application of LLE    - Medical comanagement appreciated  - Pain control PRN  - FU AM Labs  - DVT ppx: ASA  - WBAT RLE in KI at all times, no knee flexion  - NWB LLE in SLC  - FU hand surgery recs appreciated regarding UE injuries. Patient with acute comminuted fx of the left second MC, as well as right 5th MC fx. Continue immobilization, with outpatient fu as per Plastic sx consult.   - PT/OT/OOB  - Orthopedically stable for discharge. Patient to follow up with Dr. Tobias for further evaluation and treatment.   - Dispo: AR, pending PMR consult, bed/auth.  Will discuss with attending Dr. Tobias and advise if any changes to plan 8

## 2022-11-02 NOTE — CONSULT NOTE ADULT - REASON FOR ADMISSION
R Knee Traumatic Arthrotomy
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
## 2022-11-02 NOTE — PROGRESS NOTE ADULT - NS ATTEND AMEND GEN_ALL_CORE FT
Pt care and plan discussed and reviewed with PA. Plan as outlined above edited by me to reflect our discussion. Advanced care planning/advanced directives discussed with patient/family. DNR status including forceful chest compressions to attempt to restart the heart, ventilator support/artificial breathing, electric shock, artificial nutrition, health care proxy, Molst form all discussed with pt. Pt wishes to consider.
Pt care and plan discussed and reviewed with PA. Plan as outlined above edited by me to reflect our discussion.
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
## 2022-11-03 ENCOUNTER — TRANSCRIPTION ENCOUNTER (OUTPATIENT)
Age: 22
End: 2022-11-03

## 2022-11-03 LAB
BLD GP AB SCN SERPL QL: NEGATIVE — SIGNIFICANT CHANGE UP
RH IG SCN BLD-IMP: POSITIVE — SIGNIFICANT CHANGE UP
SARS-COV-2 RNA SPEC QL NAA+PROBE: SIGNIFICANT CHANGE UP

## 2022-11-03 RX ORDER — SODIUM CHLORIDE 9 MG/ML
1000 INJECTION, SOLUTION INTRAVENOUS
Refills: 0 | Status: DISCONTINUED | OUTPATIENT
Start: 2022-11-04 | End: 2022-11-04

## 2022-11-03 RX ADMIN — Medication 975 MILLIGRAM(S): at 06:17

## 2022-11-03 RX ADMIN — Medication 975 MILLIGRAM(S): at 05:47

## 2022-11-03 RX ADMIN — Medication 975 MILLIGRAM(S): at 21:59

## 2022-11-03 RX ADMIN — Medication 81 MILLIGRAM(S): at 18:07

## 2022-11-03 RX ADMIN — Medication 975 MILLIGRAM(S): at 21:29

## 2022-11-03 RX ADMIN — Medication 975 MILLIGRAM(S): at 14:05

## 2022-11-03 RX ADMIN — Medication 975 MILLIGRAM(S): at 14:35

## 2022-11-03 RX ADMIN — Medication 81 MILLIGRAM(S): at 05:46

## 2022-11-03 NOTE — PROGRESS NOTE ADULT - ASSESSMENT
Impression: Stable       Plan:   Continue present treatment                 Bilat UE- treatment per Plastic, spoke with Dr. Meza who states                           patient should follow up in office 1 week.                 Out of bed, pivot, weight bearing right LE in immobilizer                 Physical therapy follow up                 Continue to monitor    Ángel Pedro PA-C  Orthopaedic Surgery  Team pager [phone]  [phone]

## 2022-11-03 NOTE — CHART NOTE - NSCHARTNOTEFT_GEN_A_CORE
Due to the patients non-weight bearing secondary to Bilat UE fractures and left metatarsal fracture. Patient will require a transport wheelchair with elevating leg rests. This is necessary to achieve daily tasks and therapies which cannot be achieved with the use of a cane or rolling walker. Patient and family are in agreement with wheelchair use at home and assistance will be provided if needed     Ángel Pedro PA-C  Orthopaedic Surgery  Team pager [phone]  [phone] Due to the patients non-weight bearing secondary to Bilat UE fractures and left metatarsal fracture. Patient will require a standard wheelchair with elevating leg rests. This is necessary to achieve daily tasks and therapies which cannot be achieved with the use of a cane or rolling walker. Patient and family are in agreement with wheelchair use at home and assistance will be provided if needed     Ángel Pedro PA-C  Orthopaedic Surgery  Team pager [phone]  [phone]

## 2022-11-04 ENCOUNTER — APPOINTMENT (OUTPATIENT)
Dept: ORTHOPEDIC SURGERY | Facility: HOSPITAL | Age: 22
End: 2022-11-04

## 2022-11-04 LAB
ANION GAP SERPL CALC-SCNC: 11 MMOL/L — SIGNIFICANT CHANGE UP (ref 5–17)
APTT BLD: 30.1 SEC — SIGNIFICANT CHANGE UP (ref 27.5–35.5)
BUN SERPL-MCNC: 13 MG/DL — SIGNIFICANT CHANGE UP (ref 7–23)
CALCIUM SERPL-MCNC: 9.6 MG/DL — SIGNIFICANT CHANGE UP (ref 8.4–10.5)
CHLORIDE SERPL-SCNC: 104 MMOL/L — SIGNIFICANT CHANGE UP (ref 96–108)
CO2 SERPL-SCNC: 23 MMOL/L — SIGNIFICANT CHANGE UP (ref 22–31)
CREAT SERPL-MCNC: 0.86 MG/DL — SIGNIFICANT CHANGE UP (ref 0.5–1.3)
EGFR: 125 ML/MIN/1.73M2 — SIGNIFICANT CHANGE UP
GLUCOSE SERPL-MCNC: 87 MG/DL — SIGNIFICANT CHANGE UP (ref 70–99)
HCT VFR BLD CALC: 44.6 % — SIGNIFICANT CHANGE UP (ref 39–50)
HGB BLD-MCNC: 15.2 G/DL — SIGNIFICANT CHANGE UP (ref 13–17)
INR BLD: 1.32 RATIO — HIGH (ref 0.88–1.16)
MCHC RBC-ENTMCNC: 28.8 PG — SIGNIFICANT CHANGE UP (ref 27–34)
MCHC RBC-ENTMCNC: 34.1 GM/DL — SIGNIFICANT CHANGE UP (ref 32–36)
MCV RBC AUTO: 84.5 FL — SIGNIFICANT CHANGE UP (ref 80–100)
NRBC # BLD: 0 /100 WBCS — SIGNIFICANT CHANGE UP (ref 0–0)
PLATELET # BLD AUTO: 252 K/UL — SIGNIFICANT CHANGE UP (ref 150–400)
POTASSIUM SERPL-MCNC: 3.9 MMOL/L — SIGNIFICANT CHANGE UP (ref 3.5–5.3)
POTASSIUM SERPL-SCNC: 3.9 MMOL/L — SIGNIFICANT CHANGE UP (ref 3.5–5.3)
PROTHROM AB SERPL-ACNC: 15.3 SEC — HIGH (ref 10.5–13.4)
RBC # BLD: 5.28 M/UL — SIGNIFICANT CHANGE UP (ref 4.2–5.8)
RBC # FLD: 13 % — SIGNIFICANT CHANGE UP (ref 10.3–14.5)
SODIUM SERPL-SCNC: 138 MMOL/L — SIGNIFICANT CHANGE UP (ref 135–145)
WBC # BLD: 9.03 K/UL — SIGNIFICANT CHANGE UP (ref 3.8–10.5)
WBC # FLD AUTO: 9.03 K/UL — SIGNIFICANT CHANGE UP (ref 3.8–10.5)

## 2022-11-04 PROCEDURE — 26676 PIN HAND DISLOCATION: CPT | Mod: F9

## 2022-11-04 PROCEDURE — 26615 TREAT METACARPAL FRACTURE: CPT | Mod: F1

## 2022-11-04 DEVICE — IMPLANTABLE DEVICE: Type: IMPLANTABLE DEVICE | Site: BILATERAL | Status: FUNCTIONAL

## 2022-11-04 DEVICE — KWIRE 1.6MM X 15.2CM: Type: IMPLANTABLE DEVICE | Site: BILATERAL | Status: FUNCTIONAL

## 2022-11-04 RX ORDER — TRAMADOL HYDROCHLORIDE 50 MG/1
50 TABLET ORAL EVERY 6 HOURS
Refills: 0 | Status: DISCONTINUED | OUTPATIENT
Start: 2022-11-04 | End: 2022-11-06

## 2022-11-04 RX ORDER — POLYETHYLENE GLYCOL 3350 17 G/17G
17 POWDER, FOR SOLUTION ORAL AT BEDTIME
Refills: 0 | Status: DISCONTINUED | OUTPATIENT
Start: 2022-11-04 | End: 2022-11-06

## 2022-11-04 RX ORDER — HYDROMORPHONE HYDROCHLORIDE 2 MG/ML
0.5 INJECTION INTRAMUSCULAR; INTRAVENOUS; SUBCUTANEOUS
Refills: 0 | Status: DISCONTINUED | OUTPATIENT
Start: 2022-11-04 | End: 2022-11-04

## 2022-11-04 RX ORDER — SODIUM CHLORIDE 9 MG/ML
1000 INJECTION, SOLUTION INTRAVENOUS
Refills: 0 | Status: DISCONTINUED | OUTPATIENT
Start: 2022-11-04 | End: 2022-11-04

## 2022-11-04 RX ORDER — OXYCODONE HYDROCHLORIDE 5 MG/1
2.5 TABLET ORAL EVERY 4 HOURS
Refills: 0 | Status: DISCONTINUED | OUTPATIENT
Start: 2022-11-04 | End: 2022-11-06

## 2022-11-04 RX ORDER — ONDANSETRON 8 MG/1
4 TABLET, FILM COATED ORAL ONCE
Refills: 0 | Status: DISCONTINUED | OUTPATIENT
Start: 2022-11-04 | End: 2022-11-04

## 2022-11-04 RX ORDER — OXYCODONE HYDROCHLORIDE 5 MG/1
5 TABLET ORAL EVERY 4 HOURS
Refills: 0 | Status: DISCONTINUED | OUTPATIENT
Start: 2022-11-04 | End: 2022-11-06

## 2022-11-04 RX ORDER — ASPIRIN/CALCIUM CARB/MAGNESIUM 324 MG
81 TABLET ORAL
Refills: 0 | Status: DISCONTINUED | OUTPATIENT
Start: 2022-11-04 | End: 2022-11-06

## 2022-11-04 RX ORDER — ACETAMINOPHEN 500 MG
975 TABLET ORAL EVERY 8 HOURS
Refills: 0 | Status: DISCONTINUED | OUTPATIENT
Start: 2022-11-04 | End: 2022-11-06

## 2022-11-04 RX ORDER — MAGNESIUM HYDROXIDE 400 MG/1
30 TABLET, CHEWABLE ORAL DAILY
Refills: 0 | Status: DISCONTINUED | OUTPATIENT
Start: 2022-11-04 | End: 2022-11-06

## 2022-11-04 RX ORDER — FOLIC ACID 0.8 MG
1 TABLET ORAL DAILY
Refills: 0 | Status: DISCONTINUED | OUTPATIENT
Start: 2022-11-04 | End: 2022-11-06

## 2022-11-04 RX ORDER — SODIUM CHLORIDE 9 MG/ML
1000 INJECTION, SOLUTION INTRAVENOUS
Refills: 0 | Status: DISCONTINUED | OUTPATIENT
Start: 2022-11-04 | End: 2022-11-06

## 2022-11-04 RX ORDER — PANTOPRAZOLE SODIUM 20 MG/1
40 TABLET, DELAYED RELEASE ORAL
Refills: 0 | Status: DISCONTINUED | OUTPATIENT
Start: 2022-11-04 | End: 2022-11-06

## 2022-11-04 RX ORDER — INFLUENZA VIRUS VACCINE 15; 15; 15; 15 UG/.5ML; UG/.5ML; UG/.5ML; UG/.5ML
0.5 SUSPENSION INTRAMUSCULAR ONCE
Refills: 0 | Status: DISCONTINUED | OUTPATIENT
Start: 2022-11-04 | End: 2022-11-06

## 2022-11-04 RX ORDER — SENNA PLUS 8.6 MG/1
2 TABLET ORAL AT BEDTIME
Refills: 0 | Status: DISCONTINUED | OUTPATIENT
Start: 2022-11-04 | End: 2022-11-06

## 2022-11-04 RX ADMIN — SODIUM CHLORIDE 75 MILLILITER(S): 9 INJECTION, SOLUTION INTRAVENOUS at 00:30

## 2022-11-04 RX ADMIN — Medication 975 MILLIGRAM(S): at 05:57

## 2022-11-04 RX ADMIN — OXYCODONE HYDROCHLORIDE 5 MILLIGRAM(S): 5 TABLET ORAL at 21:27

## 2022-11-04 RX ADMIN — Medication 975 MILLIGRAM(S): at 06:27

## 2022-11-04 RX ADMIN — OXYCODONE HYDROCHLORIDE 5 MILLIGRAM(S): 5 TABLET ORAL at 21:57

## 2022-11-04 RX ADMIN — Medication 81 MILLIGRAM(S): at 21:27

## 2022-11-04 RX ADMIN — Medication 975 MILLIGRAM(S): at 21:57

## 2022-11-04 RX ADMIN — Medication 975 MILLIGRAM(S): at 21:26

## 2022-11-04 NOTE — PROGRESS NOTE ADULT - ATTENDING COMMENTS
Patient seen and discussed with resident.  Films reviewed.  Agree with assessment and plan.    L 2nd IM screw, 5 5th CMC CRPP for later today.   Discussed with patient and sister.
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
## 2022-11-04 NOTE — BRIEF OPERATIVE NOTE - OPERATION/FINDINGS
left index metacarpal neck fracture closed reduction and intramedullary fixation.  right small metacarpal base fracture closed reduction and percutaneous fixation
Right knee I&D for traumatic arthrotomy, skin closed with Nylon sutures    Closed treatment of Left 5th metatarsal base fracture with short leg cast
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
## 2022-11-04 NOTE — PRE-ANESTHESIA EVALUATION ADULT - NSANTHASARD_GEN_ALL_CORE
Anesthesia ROS/Med Hx    Overall Review:  Pts. EKG was reviewed, Pts.echo was reviewed and Pts.stress test was reviewed     Pulmonary Review:    Pt. positive for COPD - Severity: mild  The patient is a former smoker. Neuro/Psych Review:    Pt. positive for headaches    Cardiovascular Review:    Pt. positive for valvular problems/murmurs (h/o membranous VSD noted on echo 2003)  Pt. positive for hyperlipidemia    End/Other Review:    Pt. positive for anemia - Chronic  Overall Review of Systems Comments:  H/o Chronic low back pain  S/p Lumbar laminectomy 2014  VSD hemodynamically insignificant per echo      Anesthesia Plan     ASA Status: 2  Anesthesia Type: MAC  Induction: Intravenous  Reviewed: Past Med History, Problem List, Medications, DNR Status, NPO Status, Allergies, Patient Summary, Pre-Induction Reassessment and Beta Blocker Status  The proposed anesthetic plan, including its risks and benefits, have been discussed with the Patient - along with the risks and benefits of alternatives. Questions were encouraged and answered and the patient and/or representative agrees to proceed.   Blood Products: Not Anticipated      Physical Exam  Mallampati: II  TM Distance: >3 FB  Neck ROM: Full  Cardio Rhythm: Regular  Cardio Rate: Normal  Patient demonstrates: Murmur  pulmonary exam normal  abdominal exam normal  dental exam normal
2E
2E

## 2022-11-04 NOTE — CHART NOTE - NSCHARTNOTEFT_GEN_A_CORE
Resting without complaints.  Patient family present bedside.  No Chest Pain, SOB, N/V.    T(C): 36.7 (11-04-22 @ 18:30), Max: 36.9 (11-03-22 @ 20:47)  HR: 56 (11-04-22 @ 18:30) (54 - 121)  BP: 104/67 (11-04-22 @ 18:30) (104/67 - 145/71)  RR: 18 (11-04-22 @ 18:30) (14 - 18)  SpO2: 100% (11-04-22 @ 18:30) (96% - 100%)      Exam:  Alert and [place], No Acute Distress  Card: +S1/S2, RRR  Pulm: CTAB  Laterality: R 5th digit metacarpal base  Soft Dressing and coban c/d/i  Wiggles digits 1-5    Laterality: L 2nd digit (index) metacarpal neck  Soft dressing and coban    Compartments soft, non-tender bilaterally  Wiggles digits 1-5, sensation intact throughout BUE, decreased sensation reported in R  SILT  Normal blanching and capillary refill < 2 seconds throughout all BUE digits.    Xray: Intra-Op fluoroscopy, s/p left index metacarpal neck fracture closed reduction and intramedullary fixation.  Right small metacarpal base fracture closed reduction and percutaneous fixation.  Hardware in place and intact with no signs of new Fx.                          15.2   9.03  )-----------( 252      ( 04 Nov 2022 04:34 )             44.6    11-04    138  |  104  |  13  ----------------------------<  87  3.9   |  23  |  0.86    Ca    9.6      04 Nov 2022 04:34        A/P: 22y Male S/p left index metacarpal neck fracture closed reduction and intramedullary fixation.  Right small metacarpal base fracture closed reduction and percutaneous fixation. VSS. NAD  -PT/OT-NWB BUE, okay to use platform walker bilaterally.  WBAT RLE with KI, NWB LLE in SLC.  -F/U AM labs tomorrow  -IS  -DVT PPx: ASA 81mg PO BID, early OOB and Amb  -Pain Control  -Continue Current Tx  -Dispo planning pending PT/OT Re-evaluation     Geoffrey Baca PA-C  Orthopedic Surgery Team  Team Pager #[phone] Resting without complaints.  Patient family present bedside.  No Chest Pain, SOB, N/V.    T(C): 36.7 (11-04-22 @ 18:30), Max: 36.9 (11-03-22 @ 20:47)  HR: 56 (11-04-22 @ 18:30) (54 - 121)  BP: 104/67 (11-04-22 @ 18:30) (104/67 - 145/71)  RR: 18 (11-04-22 @ 18:30) (14 - 18)  SpO2: 100% (11-04-22 @ 18:30) (96% - 100%)      Exam:  Alert and [place], No Acute Distress  Card: +S1/S2, RRR  Pulm: CTAB  Laterality: R 5th digit metacarpal base  Soft Dressing and coban c/d/i  Wiggles digits 1-5    Laterality: L 2nd digit (index) metacarpal neck  Soft dressing and coban    Compartments soft, non-tender bilaterally  Wiggles digits 1-5, sensation intact throughout BUE with the exception of  decreased sensation reported in R 5th digit.  SILT  Normal blanching and capillary refill < 2 seconds throughout all BUE digits.    Xray: Intra-Op fluoroscopy, s/p left index metacarpal neck fracture closed reduction and intramedullary fixation.  Right small metacarpal base fracture closed reduction and percutaneous fixation.  Hardware in place and intact with no signs of new Fx.                          15.2   9.03  )-----------( 252      ( 04 Nov 2022 04:34 )             44.6    11-04    138  |  104  |  13  ----------------------------<  87  3.9   |  23  |  0.86    Ca    9.6      04 Nov 2022 04:34        A/P: 22y Male S/p left index metacarpal neck fracture closed reduction and intramedullary fixation.  Right small metacarpal base fracture closed reduction and percutaneous fixation. VSS. NAD  -PT/OT-NWB BUE, okay to use platform walker bilaterally.  WBAT RLE with KI, NWB LLE in SLC.  -F/U AM labs tomorrow  -IS  -DVT PPx: ASA 81mg PO BID, early OOB and Amb  -Pain Control  -Continue Current Tx  -Dispo planning pending PT/OT Re-evaluation     Geoffrey Baca PA-C  Orthopedic Surgery Team  Team Pager #[phone]

## 2022-11-05 ENCOUNTER — TRANSCRIPTION ENCOUNTER (OUTPATIENT)
Age: 22
End: 2022-11-05

## 2022-11-05 DIAGNOSIS — S62.309A UNSPECIFIED FRACTURE OF UNSPECIFIED METACARPAL BONE, INITIAL ENCOUNTER FOR CLOSED FRACTURE: ICD-10-CM

## 2022-11-05 LAB
ANION GAP SERPL CALC-SCNC: 8 MMOL/L — SIGNIFICANT CHANGE UP (ref 5–17)
BUN SERPL-MCNC: 16 MG/DL — SIGNIFICANT CHANGE UP (ref 7–23)
CALCIUM SERPL-MCNC: 8.9 MG/DL — SIGNIFICANT CHANGE UP (ref 8.4–10.5)
CHLORIDE SERPL-SCNC: 103 MMOL/L — SIGNIFICANT CHANGE UP (ref 96–108)
CO2 SERPL-SCNC: 23 MMOL/L — SIGNIFICANT CHANGE UP (ref 22–31)
CREAT SERPL-MCNC: 0.77 MG/DL — SIGNIFICANT CHANGE UP (ref 0.5–1.3)
EGFR: 129 ML/MIN/1.73M2 — SIGNIFICANT CHANGE UP
GLUCOSE SERPL-MCNC: 114 MG/DL — HIGH (ref 70–99)
HCT VFR BLD CALC: 42.5 % — SIGNIFICANT CHANGE UP (ref 39–50)
HGB BLD-MCNC: 14.2 G/DL — SIGNIFICANT CHANGE UP (ref 13–17)
MCHC RBC-ENTMCNC: 28.8 PG — SIGNIFICANT CHANGE UP (ref 27–34)
MCHC RBC-ENTMCNC: 33.4 GM/DL — SIGNIFICANT CHANGE UP (ref 32–36)
MCV RBC AUTO: 86.2 FL — SIGNIFICANT CHANGE UP (ref 80–100)
NRBC # BLD: 0 /100 WBCS — SIGNIFICANT CHANGE UP (ref 0–0)
PLATELET # BLD AUTO: 280 K/UL — SIGNIFICANT CHANGE UP (ref 150–400)
POTASSIUM SERPL-MCNC: 4.1 MMOL/L — SIGNIFICANT CHANGE UP (ref 3.5–5.3)
POTASSIUM SERPL-SCNC: 4.1 MMOL/L — SIGNIFICANT CHANGE UP (ref 3.5–5.3)
RBC # BLD: 4.93 M/UL — SIGNIFICANT CHANGE UP (ref 4.2–5.8)
RBC # FLD: 12.6 % — SIGNIFICANT CHANGE UP (ref 10.3–14.5)
SODIUM SERPL-SCNC: 134 MMOL/L — LOW (ref 135–145)
WBC # BLD: 11.52 K/UL — HIGH (ref 3.8–10.5)
WBC # FLD AUTO: 11.52 K/UL — HIGH (ref 3.8–10.5)

## 2022-11-05 RX ORDER — BENZOCAINE AND MENTHOL 5; 1 G/100ML; G/100ML
1 LIQUID ORAL
Refills: 0 | Status: DISCONTINUED | OUTPATIENT
Start: 2022-11-05 | End: 2022-11-06

## 2022-11-05 RX ADMIN — BENZOCAINE AND MENTHOL 1 LOZENGE: 5; 1 LIQUID ORAL at 05:20

## 2022-11-05 RX ADMIN — Medication 975 MILLIGRAM(S): at 14:12

## 2022-11-05 RX ADMIN — Medication 975 MILLIGRAM(S): at 23:08

## 2022-11-05 RX ADMIN — PANTOPRAZOLE SODIUM 40 MILLIGRAM(S): 20 TABLET, DELAYED RELEASE ORAL at 05:20

## 2022-11-05 RX ADMIN — Medication 975 MILLIGRAM(S): at 05:19

## 2022-11-05 RX ADMIN — Medication 1 TABLET(S): at 12:21

## 2022-11-05 RX ADMIN — OXYCODONE HYDROCHLORIDE 5 MILLIGRAM(S): 5 TABLET ORAL at 23:08

## 2022-11-05 RX ADMIN — Medication 975 MILLIGRAM(S): at 22:08

## 2022-11-05 RX ADMIN — Medication 81 MILLIGRAM(S): at 10:25

## 2022-11-05 RX ADMIN — Medication 1 MILLIGRAM(S): at 12:21

## 2022-11-05 RX ADMIN — Medication 81 MILLIGRAM(S): at 22:08

## 2022-11-05 RX ADMIN — TRAMADOL HYDROCHLORIDE 50 MILLIGRAM(S): 50 TABLET ORAL at 11:30

## 2022-11-05 RX ADMIN — Medication 975 MILLIGRAM(S): at 12:21

## 2022-11-05 RX ADMIN — OXYCODONE HYDROCHLORIDE 5 MILLIGRAM(S): 5 TABLET ORAL at 05:19

## 2022-11-05 RX ADMIN — OXYCODONE HYDROCHLORIDE 5 MILLIGRAM(S): 5 TABLET ORAL at 05:49

## 2022-11-05 RX ADMIN — OXYCODONE HYDROCHLORIDE 5 MILLIGRAM(S): 5 TABLET ORAL at 22:09

## 2022-11-05 RX ADMIN — BENZOCAINE AND MENTHOL 1 LOZENGE: 5; 1 LIQUID ORAL at 01:20

## 2022-11-05 RX ADMIN — Medication 975 MILLIGRAM(S): at 05:49

## 2022-11-05 RX ADMIN — TRAMADOL HYDROCHLORIDE 50 MILLIGRAM(S): 50 TABLET ORAL at 10:25

## 2022-11-05 NOTE — OCCUPATIONAL THERAPY INITIAL EVALUATION ADULT - TRANSFER TRAINING, PT EVAL
Pt will complete functional toilet transfer with CGA and appropriate AD within 4 weeks.
Pt will complete functional toilet transfer with CGA and appropriate AD within 4 weeks.
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
## 2022-11-05 NOTE — PHYSICAL THERAPY INITIAL EVALUATION ADULT - NSPTDISCHREC_GEN_A_CORE
TBD pending further functional assessment
if pt returns home will require home PT and assist with all functional mobility/Acute Inpatient Rehab
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
## 2022-11-05 NOTE — OCCUPATIONAL THERAPY INITIAL EVALUATION ADULT - LIVES WITH, PROFILE
Pt lives with parents however states upon d/c he can stay at brothers home with elevator access and ramp. Prior to admission pt was independent with all functional mobility and no AD./parents
Pt lives with parents however states upon d/c he can stay at brothers home with elevator access and ramp. Prior to admission pt was independent with all functional mobility and no AD./parents

## 2022-11-05 NOTE — OCCUPATIONAL THERAPY INITIAL EVALUATION ADULT - PLANNED THERAPY INTERVENTIONS, OT EVAL
ADL retraining/balance training/bed mobility training/strengthening/transfer training
ADL retraining/balance training/bed mobility training/strengthening/transfer training

## 2022-11-05 NOTE — PHYSICAL THERAPY INITIAL EVALUATION ADULT - ADDITIONAL COMMENTS
Pt lives with parents however states upon d/c he can stay at brothers home with elevator access and ramp. Prior to admission pt was independent with all functional mobility and no AD. Pt lives with parents however states upon d/c he can stay at brothers home with elevator access and ramp. Prior to admission pt was independent with all functional mobility and no AD.    Family states they will purchase hospital bed and platform walker.

## 2022-11-05 NOTE — PHYSICAL THERAPY INITIAL EVALUATION ADULT - TRANSFER TRAINING, PT EVAL
GOAL: Pt will perform sit<>stand transfer with minAx1 in 2wks.
GOAL: Pt will perform sit<>stand transfer independently with least restrictive device in 2wks.

## 2022-11-05 NOTE — PHYSICAL THERAPY INITIAL EVALUATION ADULT - BALANCE TRAINING, PT EVAL
GOAL: Pt will increase static/dynamic standing balance by 1/2 grade in 2wks to decrease fall risk and increase independence with ADLs
GOAL: Pt will increase static/dynamic standing balance by 1/2 grade in 2wks to decrease fall risk and increase independence with ADLs

## 2022-11-05 NOTE — PHYSICAL THERAPY INITIAL EVALUATION ADULT - PLANNED THERAPY INTERVENTIONS, PT EVAL
balance training/bed mobility training/gait training/strengthening/transfer training
balance training/bed mobility training/strengthening/transfer training

## 2022-11-05 NOTE — PHYSICAL THERAPY INITIAL EVALUATION ADULT - GENERAL OBSERVATIONS, REHAB EVAL
pt received semi-supine in bed in NAD +IV lock, condom catheter, b/l hand splints and ace wrap, R KI, L short leg cast pt received semi-supine in bed in NAD +IV lock, b/l hands wrapped, R KI, L short leg cast

## 2022-11-05 NOTE — PROGRESS NOTE ADULT - PROBLEM SELECTOR PLAN 1
PT/OT-WBAT RLE in KI (NO KNEE MOBILITY)  NWB LLE in cast   NWB Bilat UE's, but may use Platform walker through elbow per Dr. Curtis  IS  DVT PPx  Pain Control  Continue Current Tx.  Discharge planning    Minh Gillespie PA-C  Team Pager: #[phone]

## 2022-11-05 NOTE — PHYSICAL THERAPY INITIAL EVALUATION ADULT - STRENGTHENING, PT EVAL
GOAL: Pt will improve BUE/BLE strength by 1/2 grade in 2wks to improve overall functional mobility
GOAL: Pt will improve BUE/BLE strength by 1/2 grade in 2wks to improve overall functional mobility

## 2022-11-05 NOTE — OCCUPATIONAL THERAPY INITIAL EVALUATION ADULT - ADL RETRAINING, OT EVAL
Pt will complete upper body dressing with min A as needed within 4 weeks.
Pt will complete upper body dressing with min A as needed within 4 weeks.
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
## 2022-11-05 NOTE — OCCUPATIONAL THERAPY INITIAL EVALUATION ADULT - GROOMING, PREVIOUS LEVEL OF FUNCTION, OT EVAL
Patient tolerated application of steri-strips to affected area on foot well.
independent
independent

## 2022-11-05 NOTE — OCCUPATIONAL THERAPY INITIAL EVALUATION ADULT - BALANCE TRAINING, PT EVAL
Pt will improve static standing balance by 1/2 grade to improve functional performance with ADLs within 4 weeks.
Pt will improve static standing balance by 1/2 grade to improve functional performance with ADLs within 4 weeks.

## 2022-11-05 NOTE — PHYSICAL THERAPY INITIAL EVALUATION ADULT - BALANCE DISTURBANCE, IDENTIFIED IMPAIRMENT CONTRIBUTE, REHAB EVAL
pain/impaired postural control/decreased ROM/decreased strength
pain/decreased ROM/decreased strength

## 2022-11-05 NOTE — PHYSICAL THERAPY INITIAL EVALUATION ADULT - ACTIVE RANGE OF MOTION EXAMINATION, REHAB EVAL
pt able to wiggle all toes; b/l elbows and shoulders WLF; b/l wrist and hands casted
RLE in KI; LLE in SLC; b/l hands wrapped; all extremities not injured WFL

## 2022-11-05 NOTE — PHYSICAL THERAPY INITIAL EVALUATION ADULT - BED MOBILITY TRAINING, PT EVAL
GOAL: Pt will require CG assist with bed mobility in 2wks.
GOAL: Pt will be independent with bed mobility in 2wks. .

## 2022-11-05 NOTE — OCCUPATIONAL THERAPY INITIAL EVALUATION ADULT - TRANSFER SAFETY CONCERNS NOTED: BED/CHAIR, REHAB EVAL
squat pivot/inability to maintain weight-bearing restrictions w/o assist/decreased weight-shifting ability
decreased weight-shifting ability

## 2022-11-05 NOTE — OCCUPATIONAL THERAPY INITIAL EVALUATION ADULT - DIAGNOSIS, OT EVAL
Pt p/w impaired balance, strength, endurance, AROM impacting independence with ADLs and functional mobility.
Pt p/w impaired balance, strength, endurance, AROM impacting independence with ADLs and functional mobility.
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
## 2022-11-05 NOTE — OCCUPATIONAL THERAPY INITIAL EVALUATION ADULT - TRANSFER SAFETY CONCERNS NOTED: SIT/STAND, REHAB EVAL
inability to maintain weight-bearing restrictions w/o assist/decreased weight-shifting ability
decreased weight-shifting ability

## 2022-11-05 NOTE — DISCHARGE NOTE NURSING/CASE MANAGEMENT/SOCIAL WORK - NSDCPEFALRISK_GEN_ALL_CORE
For information on Fall & Injury Prevention, visit: [email or web address] OR  [email or web address] OR  https://www.cdc.gov/steadi/patient.html
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
## 2022-11-05 NOTE — OCCUPATIONAL THERAPY INITIAL EVALUATION ADULT - NSOTDISCHREC_GEN_A_CORE
TBD pending functional progress however if pt returns home will require hospital bed, platform rollator, drop arm commode and wheelchair with elevating swing away leg rests, if pt returns home will require home OT and assist with all functional mobility/ADLs/Acute Inpatient Rehab

## 2022-11-05 NOTE — OCCUPATIONAL THERAPY INITIAL EVALUATION ADULT - BED MOBILITY TRAINING, PT EVAL
Pt will perform supine to sit with CGA within 4 weeks.
Pt will perform supine to sit with CGA within 4 weeks.

## 2022-11-05 NOTE — PHYSICAL THERAPY INITIAL EVALUATION ADULT - MD ORDER
RLE WBAT in   SORENE NWLILY  BUE: VICK
RLE WBAT in KI; LLE NWB; BUE: NWB  11/4: left index metacarpal neck fracture closed reduction and intramedullary fixation.  right small metacarpal base fracture closed reduction and percutaneous fixation; OK to weigh-bear BUE for platform walker

## 2022-11-05 NOTE — DISCHARGE NOTE NURSING/CASE MANAGEMENT/SOCIAL WORK - NSDCVIVACCINE_GEN_ALL_CORE_FT
Tdap; 29-Oct-2022 17:29; Owen Lynn (RN); [employer]; [id number] (Exp. Date: 18-Apr-2024); IntraMuscular; Deltoid Right.; 0.5 milliLiter(s); VIS (VIS Published: 09-May-2013, VIS Presented: 29-Oct-2022);

## 2022-11-05 NOTE — DISCHARGE NOTE NURSING/CASE MANAGEMENT/SOCIAL WORK - PATIENT PORTAL LINK FT
You can access the FollowMyHealth Patient Portal offered by [facility] by registering at the following website: [email or web address]. By joining [employer]’s FollowMyHealth portal, you will also be able to view your health information using other applications (apps) compatible with our system.

## 2022-11-05 NOTE — OCCUPATIONAL THERAPY INITIAL EVALUATION ADULT - WEIGHT-BEARING RESTRICTIONS: SIT/STAND, REHAB EVAL
BUE NWB, LLE NWB, RLE WBAT KI/weight-bearing as tolerated/nonweight-bearing
RLE WBAT in , TAMIKA NWB, FLYNN NWB

## 2022-11-05 NOTE — PHYSICAL THERAPY INITIAL EVALUATION ADULT - NSPTDMEREC_GEN_A_CORE
TBD pending further functional assessment
if pt returns home will require hospital bed, platform rollator, drop arm commode and wheelchair with elevating swing away leg rests

## 2022-11-05 NOTE — OCCUPATIONAL THERAPY INITIAL EVALUATION ADULT - GENERAL OBSERVATIONS, REHAB EVAL
pt received supine in bed +RLE KI
Pt presents semi-supine, +BUE hand splints/ace wrap, +LLE cast, +RLE KI with acewrap, +condom cath.

## 2022-11-05 NOTE — PHYSICAL THERAPY INITIAL EVALUATION ADULT - DID THE PATIENT HAVE SURGERY?
Right knee I&D for traumatic arthrotomy, skin closed with Nylon sutures; Closed treatment of Left 5th metatarsal base fracture with short leg cast/yes
Right knee I&D for traumatic arthrotomy, skin closed with Nylon sutures; Closed treatment of Left 5th metatarsal base fracture with short leg cast/yes

## 2022-11-06 VITALS
HEART RATE: 52 BPM | OXYGEN SATURATION: 97 % | DIASTOLIC BLOOD PRESSURE: 63 MMHG | SYSTOLIC BLOOD PRESSURE: 109 MMHG | RESPIRATION RATE: 18 BRPM | TEMPERATURE: 98 F

## 2022-11-06 PROCEDURE — 80048 BASIC METABOLIC PNL TOTAL CA: CPT

## 2022-11-06 PROCEDURE — 97535 SELF CARE MNGMENT TRAINING: CPT

## 2022-11-06 PROCEDURE — 72125 CT NECK SPINE W/O DYE: CPT | Mod: MA

## 2022-11-06 PROCEDURE — 97168 OT RE-EVAL EST PLAN CARE: CPT

## 2022-11-06 PROCEDURE — 80053 COMPREHEN METABOLIC PANEL: CPT

## 2022-11-06 PROCEDURE — 97166 OT EVAL MOD COMPLEX 45 MIN: CPT

## 2022-11-06 PROCEDURE — 76376 3D RENDER W/INTRP POSTPROCES: CPT

## 2022-11-06 PROCEDURE — 86850 RBC ANTIBODY SCREEN: CPT

## 2022-11-06 PROCEDURE — U0005: CPT

## 2022-11-06 PROCEDURE — C1713: CPT

## 2022-11-06 PROCEDURE — 73564 X-RAY EXAM KNEE 4 OR MORE: CPT

## 2022-11-06 PROCEDURE — 83690 ASSAY OF LIPASE: CPT

## 2022-11-06 PROCEDURE — 73030 X-RAY EXAM OF SHOULDER: CPT

## 2022-11-06 PROCEDURE — 85027 COMPLETE CBC AUTOMATED: CPT

## 2022-11-06 PROCEDURE — 73610 X-RAY EXAM OF ANKLE: CPT

## 2022-11-06 PROCEDURE — 97163 PT EVAL HIGH COMPLEX 45 MIN: CPT

## 2022-11-06 PROCEDURE — 90715 TDAP VACCINE 7 YRS/> IM: CPT

## 2022-11-06 PROCEDURE — 97530 THERAPEUTIC ACTIVITIES: CPT

## 2022-11-06 PROCEDURE — 74177 CT ABD & PELVIS W/CONTRAST: CPT | Mod: MA

## 2022-11-06 PROCEDURE — 97164 PT RE-EVAL EST PLAN CARE: CPT

## 2022-11-06 PROCEDURE — 73700 CT LOWER EXTREMITY W/O DYE: CPT | Mod: MA

## 2022-11-06 PROCEDURE — 85610 PROTHROMBIN TIME: CPT

## 2022-11-06 PROCEDURE — 85025 COMPLETE CBC W/AUTO DIFF WBC: CPT

## 2022-11-06 PROCEDURE — 73706 CT ANGIO LWR EXTR W/O&W/DYE: CPT | Mod: MA

## 2022-11-06 PROCEDURE — 83605 ASSAY OF LACTIC ACID: CPT

## 2022-11-06 PROCEDURE — 36415 COLL VENOUS BLD VENIPUNCTURE: CPT

## 2022-11-06 PROCEDURE — 29125 APPL SHORT ARM SPLINT STATIC: CPT

## 2022-11-06 PROCEDURE — 99291 CRITICAL CARE FIRST HOUR: CPT | Mod: 25

## 2022-11-06 PROCEDURE — 81003 URINALYSIS AUTO W/O SCOPE: CPT

## 2022-11-06 PROCEDURE — 73110 X-RAY EXAM OF WRIST: CPT

## 2022-11-06 PROCEDURE — U0003: CPT

## 2022-11-06 PROCEDURE — 86901 BLOOD TYPING SEROLOGIC RH(D): CPT

## 2022-11-06 PROCEDURE — 90471 IMMUNIZATION ADMIN: CPT

## 2022-11-06 PROCEDURE — 71045 X-RAY EXAM CHEST 1 VIEW: CPT

## 2022-11-06 PROCEDURE — 97116 GAIT TRAINING THERAPY: CPT

## 2022-11-06 PROCEDURE — 73630 X-RAY EXAM OF FOOT: CPT

## 2022-11-06 PROCEDURE — 96375 TX/PRO/DX INJ NEW DRUG ADDON: CPT

## 2022-11-06 PROCEDURE — 86900 BLOOD TYPING SEROLOGIC ABO: CPT

## 2022-11-06 PROCEDURE — 70450 CT HEAD/BRAIN W/O DYE: CPT | Mod: MA

## 2022-11-06 PROCEDURE — 97110 THERAPEUTIC EXERCISES: CPT

## 2022-11-06 PROCEDURE — 73130 X-RAY EXAM OF HAND: CPT

## 2022-11-06 PROCEDURE — 76000 FLUOROSCOPY <1 HR PHYS/QHP: CPT

## 2022-11-06 PROCEDURE — 71260 CT THORAX DX C+: CPT | Mod: MA

## 2022-11-06 PROCEDURE — 96374 THER/PROPH/DIAG INJ IV PUSH: CPT

## 2022-11-06 PROCEDURE — 85730 THROMBOPLASTIN TIME PARTIAL: CPT

## 2022-11-06 PROCEDURE — C9399: CPT

## 2022-11-06 RX ORDER — TRAMADOL HYDROCHLORIDE 50 MG/1
1 TABLET ORAL
Qty: 28 | Refills: 0
Start: 2022-11-06 | End: 2022-11-12

## 2022-11-06 RX ORDER — OXYCODONE HYDROCHLORIDE 5 MG/1
1 TABLET ORAL
Qty: 42 | Refills: 0
Start: 2022-11-06 | End: 2022-11-12

## 2022-11-06 RX ORDER — SENNA PLUS 8.6 MG/1
2 TABLET ORAL
Qty: 14 | Refills: 0
Start: 2022-11-06 | End: 2022-11-12

## 2022-11-06 RX ORDER — PANTOPRAZOLE SODIUM 20 MG/1
1 TABLET, DELAYED RELEASE ORAL
Qty: 30 | Refills: 0
Start: 2022-11-06 | End: 2022-12-05

## 2022-11-06 RX ORDER — ASPIRIN/CALCIUM CARB/MAGNESIUM 324 MG
1 TABLET ORAL
Qty: 60 | Refills: 0
Start: 2022-11-06 | End: 2022-12-05

## 2022-11-06 RX ORDER — ACETAMINOPHEN 500 MG
3 TABLET ORAL
Qty: 0 | Refills: 0 | DISCHARGE
Start: 2022-11-06

## 2022-11-06 RX ADMIN — Medication 1 TABLET(S): at 12:17

## 2022-11-06 RX ADMIN — Medication 81 MILLIGRAM(S): at 09:51

## 2022-11-06 RX ADMIN — OXYCODONE HYDROCHLORIDE 5 MILLIGRAM(S): 5 TABLET ORAL at 09:51

## 2022-11-06 RX ADMIN — OXYCODONE HYDROCHLORIDE 5 MILLIGRAM(S): 5 TABLET ORAL at 15:55

## 2022-11-06 RX ADMIN — Medication 975 MILLIGRAM(S): at 14:59

## 2022-11-06 RX ADMIN — Medication 975 MILLIGRAM(S): at 06:17

## 2022-11-06 RX ADMIN — Medication 975 MILLIGRAM(S): at 15:55

## 2022-11-06 RX ADMIN — Medication 975 MILLIGRAM(S): at 05:20

## 2022-11-06 RX ADMIN — OXYCODONE HYDROCHLORIDE 5 MILLIGRAM(S): 5 TABLET ORAL at 10:32

## 2022-11-06 RX ADMIN — Medication 1 MILLIGRAM(S): at 12:17

## 2022-11-06 RX ADMIN — OXYCODONE HYDROCHLORIDE 5 MILLIGRAM(S): 5 TABLET ORAL at 14:59
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
## 2022-11-06 NOTE — PROGRESS NOTE ADULT - REASON FOR ADMISSION
R Knee Traumatic Arthrotomy
Level 1 Trauma
R Knee Traumatic Arthrotomy
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
## 2022-11-06 NOTE — PROGRESS NOTE ADULT - SUBJECTIVE AND OBJECTIVE BOX
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 05:05), Max: 99.2 (30 Oct 2022 11:25)  HR: 64 (31 Oct 2022 05:05) (60 - 109)  BP: 150/78 (31 Oct 2022 05:05) (117/74 - 150/85)  BP(mean): 88 (30 Oct 2022 10:45) (82 - 104)  RR: 16 (31 Oct 2022 05:05) (14 - 18)  SpO2: 97% (31 Oct 2022 05:05) (95% - 99%)    Parameters below as of 31 Oct 2022 05:05  Patient On (Oxygen Delivery Method): room air                              15.2   12.50 )-----------( 227      ( 30 Oct 2022 01:16 )             45.3                         16.7   16.66 )-----------( 281      ( 29 Oct 2022 17:41 )             49.8       10-30    139  |  106  |  9   ----------------------------<  106<H>  3.5   |  24  |  0.94        PT/INR - ( 30 Oct 2022 01:16 )   PT: 15.0 sec;   INR: 1.29 ratio         PTT - ( 30 Oct 2022 01:16 )  PTT:28.5 sec        A/P: 22M POD1 s/p R knee I&D and SLC application of LLE, doing well  - Pain control  - DVT ppx  - WBAT RLE in KI at all times, no knee flexion  - NWB LLE in SLC  - FU hand surgery recs for WB of UE  - PT/OT  - Dispo home today
Orthopedics Hand     Orthopedics    Patient seen and examined at bedside. Pain is controlled. Pt feeling well. No cp sob nausea or vomiting. Patient was seen as a second opinion for bilateral metacarpal fractures and plan for orthopedic fixation of left index metacarpal and right 5th digit metacarpal base today.     Vital Signs Last 24 Hrs  T(C): 35.9 (11-04-22 @ 08:25), Max: 37.1 (11-03-22 @ 16:20)  T(F): 96.7 (11-04-22 @ 08:25), Max: 98.7 (11-03-22 @ 16:20)  HR: 55 (11-04-22 @ 08:25) (55 - 70)  BP: 114/74 (11-04-22 @ 08:25) (114/74 - 134/74)  BP(mean): --  RR: 18 (11-04-22 @ 08:25) (16 - 18)  SpO2: 98% (11-04-22 @ 08:25) (96% - 98%)                        15.2   9.03  )-----------( 252      ( 04 Nov 2022 04:34 )             44.6     04 Nov 2022 04:34    138    |  104    |  13     ----------------------------<  87     3.9     |  23     |  0.86     Ca    9.6        04 Nov 2022 04:34      PT/INR - ( 04 Nov 2022 04:34 )   PT: 15.3 sec;   INR: 1.32 ratio         PTT - ( 04 Nov 2022 04:34 )  PTT:30.1 sec    Exam:  Gen: NAD, resting comfortably  BLUE:  Splints c/d/i  +Able to move all fingers with discomfort  Sensation intact all fingers and proximal to splint  +Fingers warm and well perfused  Calf NTTP b/l  Compartments soft and compressible    A/P:  22yMale Stable with left index metacarpal fracture, right 5th metacarpal base fracture    Management of right knee and left foot per general orthopaedic team  -FU AM labs  -NWB BL UE in \[facility] [place] [facility]\""  -Pain control PRN  -NPO except medications for OR today; IVF while NPO  -Hold DVT ppx for OR today; SCDs okay  -Plan for OR today with Dr. Curtis for left index and right 5th metacarpal fixation  This surgical plan was discussed with patient and brother and Dr. Curtis to which risks/benefits were explained. Patient agrees to plan.   -Discussed with attending Dr. Alamo who agrees with plan
Name of Patient : JOSSY MICHAELS  MRN: [id number]  Date of visit: 11-03-22 @ 17:22      Subjective: Patient seen and examined. No new events except as noted.   Patient seen earlier this AM. OOB TC  At time of visit, patient reports that his pain is controlled.    REVIEW OF SYSTEMS:  CONSTITUTIONAL: Generalized weakness 2/2 accident   EYES/ENT: No visual changes;  No vertigo or throat pain   NECK: No pain or stiffness  RESPIRATORY: No cough, wheezing, hemoptysis; No shortness of breath  CARDIOVASCULAR: No chest pain or palpitations  GASTROINTESTINAL: No abdominal or epigastric pain. No nausea, vomiting, or hematemesis; No diarrhea or constipation. No melena or hematochezia.  GENITOURINARY: No dysuria, frequency or hematuria  NEUROLOGICAL: No numbness or weakness  SKIN: No itching, burning, rashes, or lesions   MSK: B/L UE in Splints; RLE in knee immobilizer, LLE in splint  All other review of systems is negative unless indicated above.    MEDICATIONS:  MEDICATIONS  (STANDING):  acetaminophen     Tablet .. 975 milliGRAM(s) Oral every 8 hours  aspirin enteric coated 81 milliGRAM(s) Oral two times a day  folic acid 1 milliGRAM(s) Oral daily  influenza   Vaccine 0.5 milliLiter(s) IntraMuscular once  multivitamin 1 Tablet(s) Oral daily  polyethylene glycol 3350 17 Gram(s) Oral at bedtime  senna 2 Tablet(s) Oral at bedtime      PHYSICAL EXAM:  T(C): 37.1 (11-03-22 @ 16:20), Max: 37.1 (11-03-22 @ 08:05)  HR: 59 (11-03-22 @ 16:20) (58 - 75)  BP: 134/74 (11-03-22 @ 16:20) (129/84 - 134/75)  RR: 17 (11-03-22 @ 16:20) (16 - 17)  SpO2: 98% (11-03-22 @ 16:20) (97% - 98%)  Wt(kg): --  I&O's Summary    02 Nov 2022 07:01  -  03 Nov 2022 07:00  --------------------------------------------------------  IN: 1080 mL / OUT: 1400 mL / NET: -320 mL    03 Nov 2022 07:01  -  03 Nov 2022 17:22  --------------------------------------------------------  IN: 240 mL / OUT: 1300 mL / NET: -1060 mL          Appearance: Normal, lying down in bed 	  HEENT:  Eyes are open    Lymphatic: No lymphadenopathy   Cardiovascular: Normal S1 S2, no JVD  Respiratory: normal effort , clear  Gastrointestinal:  Soft, Non-tender  Skin: No rashes,  warm to touch  Psychiatry:  Mood & affect appropriate  Musculoskeletal:  B/L UE in Splints; RLE in knee immobilizer, LLE in splint          11-02-22 @ 07:01  -  11-03-22 @ 07:00  --------------------------------------------------------  IN: 1080 mL / OUT: 1400 mL / NET: -320 mL    11-03-22 @ 07:01  -  11-03-22 @ 17:22  --------------------------------------------------------  IN: 240 mL / OUT: 1300 mL / NET: -1060 mL            
Post op Day [7] s/p R Knee traumatic arthrotomy I&D    Post op Day [2] s/p L Index metacarpal Fx Im screw fixation R 5th digit metacarpal base Fx closed reduction percutaneous pinning.    Patient resting without complaints.  No chest pain, SOB, N/V.    T(C): 36.4 (11-06-22 @ 05:11), Max: 36.9 (11-05-22 @ 11:30)  HR: 82 (11-06-22 @ 05:11) (56 - 82)  BP: 105/59 (11-06-22 @ 05:11) (105/59 - 134/80)  RR: 18 (11-06-22 @ 05:11) (18 - 18)  SpO2: 97% (11-06-22 @ 05:11) (94% - 98%)      Exam:  Alert and Oriented, No Acute Distress  Cardiac: Normal S1 & S2, RRR, No murmurs, rubs or gallops appreciated.  Pulmonary: 18bpm, normal breathing effort, no retractions, diminished lung sounds appreciated.  Bronchial/Vesicular lungs sounds appreciated throughout all lung lobes.    Upper Extremities: Bilateral hands  RUE: Soft dressing with coban c/d/i  wiggles digits 1-5  SILT  well perfused, normal capillary refill    LUE: Soft dressing with coban c/d/i  wiggles digits 1-5  SILT  well perfused, normal capillary refill    Lower Extremities: R Knee and L Foot  LLE: Short leg cast  wiggles digits 1-5, Normal Ankle DF/PF/EHL/FHL  SILT  well perfused, normal capillary refill    RLE: Knee Immobilizer  Ace bandage c/d/i  Motor/sensory intact  Pedal pulse 2+      Compartments to all 4 extremities soft and compressible                              14.2   11.52 )-----------( 280      ( 05 Nov 2022 06:48 )             42.5    11-05    134<L>  |  103  |  16  ----------------------------<  114<H>  4.1   |  23  |  0.77    Ca    8.9      05 Nov 2022 06:43        
Post op Day [1 ]    Patient resting without complaints.  No chest pain, SOB, N/V.  Sister at bedside.    T(C): 37.3 (11-05-22 @ 04:52), Max: 37.3 (11-04-22 @ 19:30)  HR: 56 (11-05-22 @ 04:52) (54 - 121)  BP: 137/72 (11-05-22 @ 04:52) (104/67 - 145/71)  RR: 16 (11-05-22 @ 04:52) (14 - 18)  SpO2: 98% (11-05-22 @ 04:52) (95% - 100%)  Wt(kg): --    Exam:  Alert and Oriented, No Acute Distress  RUE dsg c/d/i, moving digits, brisk cap refill  <2 sec in digits, SILT  Calves Soft, Non-tender bilaterally  RLE in KI, dsg c/d/i, +PF/DF/EHL/FHL  LLE in cast, moving toes, brisk cap refill <2 seconds  SILT                         14.2   11.52 )-----------( 280      ( 05 Nov 2022 06:48 )             42.5    11-05    134<L>  |  103  |  16  ----------------------------<  114<H>  4.1   |  23  |  0.77    Ca    8.9      05 Nov 2022 06:43

## 2022-11-06 NOTE — PROGRESS NOTE ADULT - PROVIDER SPECIALTY LIST ADULT
Internal Medicine
Orthopedics
Internal Medicine
Orthopedics
Orthopedics
Internal Medicine
Orthopedics
Plastic Surgery
Internal Medicine
Orthopedics
Orthopedics
Trauma Surgery

## 2022-11-09 ENCOUNTER — TRANSCRIPTION ENCOUNTER (OUTPATIENT)
Age: 22
End: 2022-11-09

## 2022-11-17 ENCOUNTER — APPOINTMENT (OUTPATIENT)
Dept: ORTHOPEDIC SURGERY | Facility: CLINIC | Age: 22
End: 2022-11-17

## 2022-11-17 PROCEDURE — 73130 X-RAY EXAM OF HAND: CPT | Mod: 50

## 2022-11-17 PROCEDURE — 99024 POSTOP FOLLOW-UP VISIT: CPT

## 2022-12-08 ENCOUNTER — APPOINTMENT (OUTPATIENT)
Dept: ORTHOPEDIC SURGERY | Facility: CLINIC | Age: 22
End: 2022-12-08

## 2022-12-08 PROCEDURE — 99024 POSTOP FOLLOW-UP VISIT: CPT

## 2022-12-08 PROCEDURE — 73130 X-RAY EXAM OF HAND: CPT | Mod: 50

## 2023-01-04 ENCOUNTER — APPOINTMENT (OUTPATIENT)
Dept: ORTHOPEDIC SURGERY | Facility: HOSPITAL | Age: 23
End: 2023-01-04

## 2023-01-12 ENCOUNTER — APPOINTMENT (OUTPATIENT)
Dept: ORTHOPEDIC SURGERY | Facility: CLINIC | Age: 23
End: 2023-01-12
Payer: MEDICAID

## 2023-01-12 DIAGNOSIS — S62.331D: ICD-10-CM

## 2023-01-12 PROCEDURE — 99024 POSTOP FOLLOW-UP VISIT: CPT

## 2023-01-12 PROCEDURE — 73130 X-RAY EXAM OF HAND: CPT | Mod: LT,RT

## 2023-09-07 NOTE — PATIENT PROFILE ADULT - NSTRANSFEREYEGLASSESPAIRS_GEN_A_NUR
Procedure:  COLONOSCOPY    Relevant Problems   CARDIO   (+) 1st degree AV block   (+) Coronary artery disease involving native coronary artery   (+) History of PTCA stents   (+) Hyperlipidemia   (+) Sick sinus syndrome (HCC)      MUSCULOSKELETAL   (+) Acute gout due to renal impairment involving toe of right foot      Other   (+) Sick sinus syndrome status post dual-chamber pacemaker of [address] with septal pacing        Physical Exam    Airway    Mallampati score: II  TM Distance: >3 FB  Neck ROM: full     Dental       Cardiovascular  Rhythm: regular, Rate: normal,     Pulmonary  Breath sounds clear to auscultation,     Other Findings        Anesthesia Plan  ASA Score- 3     Anesthesia Type- IV sedation with anesthesia with ASA Monitors. Additional Monitors:   Airway Plan:           Plan Factors-    Chart reviewed. Patient is not a current smoker. Induction- intravenous. Postoperative Plan-     Informed Consent- Anesthetic plan and risks discussed with patient. I personally reviewed this patient with the CRNA. Discussed and agreed on the Anesthesia Plan with the CRNA. Radha Sotomayor
1 pair

## 2023-09-21 ENCOUNTER — APPOINTMENT (OUTPATIENT)
Dept: ORTHOPEDIC SURGERY | Facility: CLINIC | Age: 23
End: 2023-09-21
Payer: MEDICAID

## 2023-09-21 DIAGNOSIS — S62.316D DISPLACED FRACTURE OF BASE OF FIFTH METACARPAL BONE, RIGHT HAND, SUBSEQUENT ENCOUNTER FOR FRACTURE WITH ROUTINE HEALING: ICD-10-CM

## 2023-09-21 PROCEDURE — 73130 X-RAY EXAM OF HAND: CPT | Mod: RT

## 2023-09-21 PROCEDURE — 99213 OFFICE O/P EST LOW 20 MIN: CPT | Mod: 25

## 2023-11-25 NOTE — ED PROVIDER NOTE - SECONDARY DIAGNOSIS.
2256- pt to pacu, resp easy, unlabored. Vss. Pt non responsive. Pt appears in no acute distress. 2302- pt continues to rest in bed with snoring resp with frequent movements. Vss.    2311- pt restless in bed, states that is normal for him. Pt denies pain, nausea. 2319- report called to ER nurse Gay Gastelum    2326- pt meets criteria for discharge from pacu. Pt continues to deny pain, nausea. Pt returned to ER room  in stable condition by PACU staff, spouse at bedside. Open traumatic subluxation of right knee joint

## 2023-12-29 NOTE — ASU PATIENT PROFILE, ADULT - PSH
Gautam Sam is a 32 year old male presenting to the walk-in clinic today for sore throat and dry cough x 3 days.     Denies OTC medication.    Swabs/Specimens collected during rooming process:  None    Patient would like communication of their results via:   [employer]        No significant past surgical history

## 2024-01-16 ENCOUNTER — APPOINTMENT (OUTPATIENT)
Dept: ORTHOPEDIC SURGERY | Facility: CLINIC | Age: 24
End: 2024-01-16

## 2024-11-19 ENCOUNTER — APPOINTMENT (OUTPATIENT)
Dept: ORTHOPEDIC SURGERY | Facility: CLINIC | Age: 24
End: 2024-11-19
Payer: MEDICAID

## 2024-11-19 DIAGNOSIS — M77.8 OTHER ENTHESOPATHIES, NOT ELSEWHERE CLASSIFIED: ICD-10-CM

## 2024-11-19 PROCEDURE — 73110 X-RAY EXAM OF WRIST: CPT | Mod: RT

## 2024-11-19 PROCEDURE — 20550 NJX 1 TENDON SHEATH/LIGAMENT: CPT | Mod: RT

## 2024-11-19 PROCEDURE — 99214 OFFICE O/P EST MOD 30 MIN: CPT | Mod: 25

## 2025-03-25 ENCOUNTER — APPOINTMENT (OUTPATIENT)
Dept: ORTHOPEDIC SURGERY | Facility: CLINIC | Age: 25
End: 2025-03-25
Payer: MEDICAID

## 2025-03-25 DIAGNOSIS — M25.531 PAIN IN RIGHT WRIST: ICD-10-CM

## 2025-03-25 PROCEDURE — 99214 OFFICE O/P EST MOD 30 MIN: CPT
